# Patient Record
Sex: FEMALE | Race: WHITE | NOT HISPANIC OR LATINO | Employment: PART TIME | URBAN - METROPOLITAN AREA
[De-identification: names, ages, dates, MRNs, and addresses within clinical notes are randomized per-mention and may not be internally consistent; named-entity substitution may affect disease eponyms.]

---

## 2019-03-20 ENCOUNTER — HOSPITAL ENCOUNTER (OUTPATIENT)
Dept: PHYSICAL THERAPY | Facility: CLINIC | Age: 59
Discharge: HOME | End: 2019-03-20
Attending: EMERGENCY MEDICINE

## 2019-08-01 ENCOUNTER — OFFICE VISIT CONVERTED (OUTPATIENT)
Dept: FAMILY MEDICINE CLINIC | Facility: CLINIC | Age: 59
End: 2019-08-01
Attending: NURSE PRACTITIONER

## 2019-08-01 ENCOUNTER — CONVERSION ENCOUNTER (OUTPATIENT)
Dept: FAMILY MEDICINE CLINIC | Facility: CLINIC | Age: 59
End: 2019-08-01

## 2019-10-15 ENCOUNTER — HOSPITAL ENCOUNTER (OUTPATIENT)
Dept: FAMILY MEDICINE CLINIC | Facility: CLINIC | Age: 59
Discharge: HOME | End: 2019-10-15
Attending: NURSE PRACTITIONER

## 2019-10-15 LAB
T4 FREE SERPL-MCNC: 1.3 NG/DL (ref 0.9–1.8)
TSH SERPL-ACNC: 5.42 M[IU]/L (ref 0.27–4.2)

## 2019-11-20 ENCOUNTER — HOSPITAL ENCOUNTER (OUTPATIENT)
Dept: FAMILY MEDICINE CLINIC | Facility: CLINIC | Age: 59
Discharge: HOME | End: 2019-11-20
Attending: NURSE PRACTITIONER

## 2019-11-20 LAB
T4 FREE SERPL-MCNC: 1.2 NG/DL (ref 0.9–1.8)
TSH SERPL-ACNC: 9.81 M[IU]/L (ref 0.27–4.2)

## 2020-01-31 ENCOUNTER — HOSPITAL ENCOUNTER (OUTPATIENT)
Dept: FAMILY MEDICINE CLINIC | Facility: CLINIC | Age: 60
Discharge: HOME | End: 2020-01-31
Attending: NURSE PRACTITIONER

## 2020-01-31 LAB
T4 FREE SERPL-MCNC: 1.6 NG/DL (ref 0.9–1.8)
TSH SERPL-ACNC: 0.34 M[IU]/L (ref 0.27–4.2)

## 2020-06-05 ENCOUNTER — OFFICE VISIT CONVERTED (OUTPATIENT)
Dept: FAMILY MEDICINE CLINIC | Facility: CLINIC | Age: 60
End: 2020-06-05
Attending: NURSE PRACTITIONER

## 2020-06-05 ENCOUNTER — HOSPITAL ENCOUNTER (OUTPATIENT)
Dept: FAMILY MEDICINE CLINIC | Facility: CLINIC | Age: 60
Discharge: HOME | End: 2020-06-05
Attending: NURSE PRACTITIONER

## 2020-06-05 LAB
25(OH)D3 SERPL-MCNC: 30.7 NG/ML (ref 30–100)
ALBUMIN SERPL-MCNC: 4.2 G/DL (ref 3.5–5)
ALBUMIN/GLOB SERPL: 1.4 {RATIO} (ref 1.4–2.6)
ALP SERPL-CCNC: 59 U/L (ref 53–141)
ALT SERPL-CCNC: 19 U/L (ref 10–40)
ANION GAP SERPL CALC-SCNC: 20 MMOL/L (ref 8–19)
AST SERPL-CCNC: 21 U/L (ref 15–50)
BASOPHILS # BLD AUTO: 0.05 10*3/UL (ref 0–0.2)
BASOPHILS NFR BLD AUTO: 1.2 % (ref 0–3)
BILIRUB SERPL-MCNC: 0.21 MG/DL (ref 0.2–1.3)
BUN SERPL-MCNC: 12 MG/DL (ref 5–25)
BUN/CREAT SERPL: 17 {RATIO} (ref 6–20)
CALCIUM SERPL-MCNC: 9.2 MG/DL (ref 8.7–10.4)
CHLORIDE SERPL-SCNC: 103 MMOL/L (ref 99–111)
CHOLEST SERPL-MCNC: 205 MG/DL (ref 107–200)
CHOLEST/HDLC SERPL: 2.7 {RATIO} (ref 3–6)
CONV ABS IMM GRAN: 0.01 10*3/UL (ref 0–0.2)
CONV CO2: 21 MMOL/L (ref 22–32)
CONV IMMATURE GRAN: 0.2 % (ref 0–1.8)
CONV TOTAL PROTEIN: 7.3 G/DL (ref 6.3–8.2)
CREAT UR-MCNC: 0.72 MG/DL (ref 0.5–0.9)
DEPRECATED RDW RBC AUTO: 43.3 FL (ref 36.4–46.3)
EOSINOPHIL # BLD AUTO: 0.19 10*3/UL (ref 0–0.7)
EOSINOPHIL # BLD AUTO: 4.6 % (ref 0–7)
ERYTHROCYTE [DISTWIDTH] IN BLOOD BY AUTOMATED COUNT: 12.6 % (ref 11.7–14.4)
GFR SERPLBLD BASED ON 1.73 SQ M-ARVRAT: >60 ML/MIN/{1.73_M2}
GLOBULIN UR ELPH-MCNC: 3.1 G/DL (ref 2–3.5)
GLUCOSE SERPL-MCNC: 100 MG/DL (ref 65–99)
HCT VFR BLD AUTO: 38.6 % (ref 37–47)
HDLC SERPL-MCNC: 75 MG/DL (ref 40–60)
HGB BLD-MCNC: 12.7 G/DL (ref 12–16)
LDLC SERPL CALC-MCNC: 108 MG/DL (ref 70–100)
LYMPHOCYTES # BLD AUTO: 1.42 10*3/UL (ref 1–5)
LYMPHOCYTES NFR BLD AUTO: 34.1 % (ref 20–45)
MCH RBC QN AUTO: 31 PG (ref 27–31)
MCHC RBC AUTO-ENTMCNC: 32.9 G/DL (ref 33–37)
MCV RBC AUTO: 94.1 FL (ref 81–99)
MONOCYTES # BLD AUTO: 0.29 10*3/UL (ref 0.2–1.2)
MONOCYTES NFR BLD AUTO: 7 % (ref 3–10)
NEUTROPHILS # BLD AUTO: 2.2 10*3/UL (ref 2–8)
NEUTROPHILS NFR BLD AUTO: 52.9 % (ref 30–85)
NRBC CBCN: 0 % (ref 0–0.7)
OSMOLALITY SERPL CALC.SUM OF ELEC: 288 MOSM/KG (ref 273–304)
PLATELET # BLD AUTO: 218 10*3/UL (ref 130–400)
PMV BLD AUTO: 11.7 FL (ref 9.4–12.3)
POTASSIUM SERPL-SCNC: 4.5 MMOL/L (ref 3.5–5.3)
RBC # BLD AUTO: 4.1 10*6/UL (ref 4.2–5.4)
SODIUM SERPL-SCNC: 139 MMOL/L (ref 135–147)
T4 FREE SERPL-MCNC: 1.6 NG/DL (ref 0.9–1.8)
TRIGL SERPL-MCNC: 109 MG/DL (ref 40–150)
TSH SERPL-ACNC: 0.37 M[IU]/L (ref 0.27–4.2)
VLDLC SERPL-MCNC: 22 MG/DL (ref 5–37)
WBC # BLD AUTO: 4.16 10*3/UL (ref 4.8–10.8)

## 2020-06-06 LAB — T3 SERPL-MCNC: 117 NG/DL (ref 71–180)

## 2020-09-21 ENCOUNTER — OFFICE VISIT CONVERTED (OUTPATIENT)
Dept: FAMILY MEDICINE CLINIC | Facility: CLINIC | Age: 60
End: 2020-09-21
Attending: NURSE PRACTITIONER

## 2020-09-21 ENCOUNTER — HOSPITAL ENCOUNTER (OUTPATIENT)
Dept: FAMILY MEDICINE CLINIC | Facility: CLINIC | Age: 60
Discharge: HOME | End: 2020-09-21
Attending: NURSE PRACTITIONER

## 2020-09-21 LAB
CHOLEST SERPL-MCNC: 238 MG/DL (ref 107–200)
CHOLEST/HDLC SERPL: 3.2 {RATIO} (ref 3–6)
FOLATE SERPL-MCNC: 19 NG/ML (ref 4.8–20)
HDLC SERPL-MCNC: 75 MG/DL (ref 40–60)
LDLC SERPL CALC-MCNC: 128 MG/DL (ref 70–100)
T4 FREE SERPL-MCNC: 1.2 NG/DL (ref 0.9–1.8)
TRIGL SERPL-MCNC: 173 MG/DL (ref 40–150)
TSH SERPL-ACNC: 11.4 M[IU]/L (ref 0.27–4.2)
VIT B12 SERPL-MCNC: 502 PG/ML (ref 211–911)
VLDLC SERPL-MCNC: 35 MG/DL (ref 5–37)

## 2020-09-22 LAB — 25(OH)D3 SERPL-MCNC: 36 NG/ML (ref 30–100)

## 2020-12-04 ENCOUNTER — HOSPITAL ENCOUNTER (OUTPATIENT)
Dept: FAMILY MEDICINE CLINIC | Facility: CLINIC | Age: 60
Discharge: HOME | End: 2020-12-04
Attending: NURSE PRACTITIONER

## 2020-12-04 ENCOUNTER — OFFICE VISIT CONVERTED (OUTPATIENT)
Dept: FAMILY MEDICINE CLINIC | Facility: CLINIC | Age: 60
End: 2020-12-04
Attending: NURSE PRACTITIONER

## 2020-12-04 LAB
BASOPHILS # BLD AUTO: 0.06 10*3/UL (ref 0–0.2)
BASOPHILS NFR BLD AUTO: 1.1 % (ref 0–3)
CONV ABS IMM GRAN: 0.01 10*3/UL (ref 0–0.2)
CONV IMMATURE GRAN: 0.2 % (ref 0–1.8)
DEPRECATED RDW RBC AUTO: 43.8 FL (ref 36.4–46.3)
EOSINOPHIL # BLD AUTO: 0.19 10*3/UL (ref 0–0.7)
EOSINOPHIL # BLD AUTO: 3.5 % (ref 0–7)
ERYTHROCYTE [DISTWIDTH] IN BLOOD BY AUTOMATED COUNT: 12.9 % (ref 11.7–14.4)
HCT VFR BLD AUTO: 36.9 % (ref 37–47)
HGB BLD-MCNC: 12.5 G/DL (ref 12–16)
LYMPHOCYTES # BLD AUTO: 1.87 10*3/UL (ref 1–5)
LYMPHOCYTES NFR BLD AUTO: 34.4 % (ref 20–45)
MCH RBC QN AUTO: 31.6 PG (ref 27–31)
MCHC RBC AUTO-ENTMCNC: 33.9 G/DL (ref 33–37)
MCV RBC AUTO: 93.2 FL (ref 81–99)
MONOCYTES # BLD AUTO: 0.32 10*3/UL (ref 0.2–1.2)
MONOCYTES NFR BLD AUTO: 5.9 % (ref 3–10)
NEUTROPHILS # BLD AUTO: 2.98 10*3/UL (ref 2–8)
NEUTROPHILS NFR BLD AUTO: 54.9 % (ref 30–85)
NRBC CBCN: 0 % (ref 0–0.7)
PLATELET # BLD AUTO: 208 10*3/UL (ref 130–400)
PMV BLD AUTO: 11.8 FL (ref 9.4–12.3)
RBC # BLD AUTO: 3.96 10*6/UL (ref 4.2–5.4)
WBC # BLD AUTO: 5.43 10*3/UL (ref 4.8–10.8)

## 2020-12-05 LAB
ALBUMIN SERPL-MCNC: 4.1 G/DL (ref 3.5–5)
ALBUMIN/GLOB SERPL: 1.4 {RATIO} (ref 1.4–2.6)
ALP SERPL-CCNC: 65 U/L (ref 53–141)
ALT SERPL-CCNC: 23 U/L (ref 10–40)
ANION GAP SERPL CALC-SCNC: 12 MMOL/L (ref 8–19)
AST SERPL-CCNC: 25 U/L (ref 15–50)
BILIRUB SERPL-MCNC: <0.15 MG/DL (ref 0.2–1.3)
BUN SERPL-MCNC: 19 MG/DL (ref 5–25)
BUN/CREAT SERPL: 23 {RATIO} (ref 6–20)
CALCIUM SERPL-MCNC: 9 MG/DL (ref 8.7–10.4)
CHLORIDE SERPL-SCNC: 106 MMOL/L (ref 99–111)
CONV CO2: 26 MMOL/L (ref 22–32)
CONV TOTAL PROTEIN: 7 G/DL (ref 6.3–8.2)
CREAT UR-MCNC: 0.82 MG/DL (ref 0.5–0.9)
GFR SERPLBLD BASED ON 1.73 SQ M-ARVRAT: >60 ML/MIN/{1.73_M2}
GLOBULIN UR ELPH-MCNC: 2.9 G/DL (ref 2–3.5)
GLUCOSE SERPL-MCNC: 92 MG/DL (ref 65–99)
OSMOLALITY SERPL CALC.SUM OF ELEC: 290 MOSM/KG (ref 273–304)
POTASSIUM SERPL-SCNC: 4.5 MMOL/L (ref 3.5–5.3)
SODIUM SERPL-SCNC: 139 MMOL/L (ref 135–147)

## 2021-02-25 ENCOUNTER — CONVERSION ENCOUNTER (OUTPATIENT)
Dept: FAMILY MEDICINE CLINIC | Facility: CLINIC | Age: 61
End: 2021-02-25

## 2021-02-25 ENCOUNTER — OFFICE VISIT CONVERTED (OUTPATIENT)
Dept: FAMILY MEDICINE CLINIC | Facility: CLINIC | Age: 61
End: 2021-02-25
Attending: NURSE PRACTITIONER

## 2021-02-25 ENCOUNTER — HOSPITAL ENCOUNTER (OUTPATIENT)
Dept: FAMILY MEDICINE CLINIC | Facility: CLINIC | Age: 61
Discharge: HOME | End: 2021-02-25
Attending: NURSE PRACTITIONER

## 2021-02-25 LAB
BASOPHILS # BLD AUTO: 0.05 10*3/UL (ref 0–0.2)
BASOPHILS NFR BLD AUTO: 0.8 % (ref 0–3)
CONV ABS IMM GRAN: 0.01 10*3/UL (ref 0–0.2)
CONV IMMATURE GRAN: 0.2 % (ref 0–1.8)
DEPRECATED RDW RBC AUTO: 42.3 FL (ref 36.4–46.3)
EOSINOPHIL # BLD AUTO: 0.17 10*3/UL (ref 0–0.7)
EOSINOPHIL # BLD AUTO: 2.7 % (ref 0–7)
ERYTHROCYTE [DISTWIDTH] IN BLOOD BY AUTOMATED COUNT: 12.5 % (ref 11.7–14.4)
HCT VFR BLD AUTO: 36.9 % (ref 37–47)
HGB BLD-MCNC: 12.2 G/DL (ref 12–16)
LYMPHOCYTES # BLD AUTO: 1.65 10*3/UL (ref 1–5)
LYMPHOCYTES NFR BLD AUTO: 26.4 % (ref 20–45)
MCH RBC QN AUTO: 30.7 PG (ref 27–31)
MCHC RBC AUTO-ENTMCNC: 33.1 G/DL (ref 33–37)
MCV RBC AUTO: 92.7 FL (ref 81–99)
MONOCYTES # BLD AUTO: 0.34 10*3/UL (ref 0.2–1.2)
MONOCYTES NFR BLD AUTO: 5.4 % (ref 3–10)
NEUTROPHILS # BLD AUTO: 4.02 10*3/UL (ref 2–8)
NEUTROPHILS NFR BLD AUTO: 64.5 % (ref 30–85)
NRBC CBCN: 0 % (ref 0–0.7)
PLATELET # BLD AUTO: 216 10*3/UL (ref 130–400)
PMV BLD AUTO: 12.1 FL (ref 9.4–12.3)
RBC # BLD AUTO: 3.98 10*6/UL (ref 4.2–5.4)
WBC # BLD AUTO: 6.24 10*3/UL (ref 4.8–10.8)

## 2021-02-26 LAB
25(OH)D3 SERPL-MCNC: 42.6 NG/ML (ref 30–100)
ALBUMIN SERPL-MCNC: 4.2 G/DL (ref 3.5–5)
ALBUMIN/GLOB SERPL: 1.4 {RATIO} (ref 1.4–2.6)
ALP SERPL-CCNC: 76 U/L (ref 53–141)
ALT SERPL-CCNC: 29 U/L (ref 10–40)
ANION GAP SERPL CALC-SCNC: 13 MMOL/L (ref 8–19)
AST SERPL-CCNC: 30 U/L (ref 15–50)
BILIRUB SERPL-MCNC: <0.15 MG/DL (ref 0.2–1.3)
BUN SERPL-MCNC: 23 MG/DL (ref 5–25)
BUN/CREAT SERPL: 26 {RATIO} (ref 6–20)
CALCIUM SERPL-MCNC: 9.2 MG/DL (ref 8.7–10.4)
CHLORIDE SERPL-SCNC: 102 MMOL/L (ref 99–111)
CHOLEST SERPL-MCNC: 236 MG/DL (ref 107–200)
CHOLEST/HDLC SERPL: 2.7 {RATIO} (ref 3–6)
CONV CO2: 26 MMOL/L (ref 22–32)
CONV TOTAL PROTEIN: 7.3 G/DL (ref 6.3–8.2)
CREAT UR-MCNC: 0.89 MG/DL (ref 0.5–0.9)
GFR SERPLBLD BASED ON 1.73 SQ M-ARVRAT: >60 ML/MIN/{1.73_M2}
GLOBULIN UR ELPH-MCNC: 3.1 G/DL (ref 2–3.5)
GLUCOSE SERPL-MCNC: 85 MG/DL (ref 65–99)
HDLC SERPL-MCNC: 87 MG/DL (ref 40–60)
LDLC SERPL CALC-MCNC: 116 MG/DL (ref 70–100)
OSMOLALITY SERPL CALC.SUM OF ELEC: 287 MOSM/KG (ref 273–304)
POTASSIUM SERPL-SCNC: 4.3 MMOL/L (ref 3.5–5.3)
SODIUM SERPL-SCNC: 137 MMOL/L (ref 135–147)
T4 FREE SERPL-MCNC: 1.7 NG/DL (ref 0.9–1.8)
TRIGL SERPL-MCNC: 167 MG/DL (ref 40–150)
TSH SERPL-ACNC: 6.23 M[IU]/L (ref 0.27–4.2)
VLDLC SERPL-MCNC: 33 MG/DL (ref 5–37)

## 2021-02-27 LAB — T3 SERPL-MCNC: 98 NG/DL (ref 71–180)

## 2021-03-12 ENCOUNTER — HOSPITAL ENCOUNTER (OUTPATIENT)
Dept: MRI IMAGING | Facility: HOSPITAL | Age: 61
Discharge: HOME | End: 2021-03-12
Attending: NURSE PRACTITIONER

## 2021-04-01 ENCOUNTER — OFFICE VISIT CONVERTED (OUTPATIENT)
Dept: ORTHOPEDIC SURGERY | Facility: CLINIC | Age: 61
End: 2021-04-01
Attending: ORTHOPAEDIC SURGERY

## 2021-04-02 ENCOUNTER — OFFICE VISIT CONVERTED (OUTPATIENT)
Dept: FAMILY MEDICINE CLINIC | Facility: CLINIC | Age: 61
End: 2021-04-02
Attending: NURSE PRACTITIONER

## 2021-04-02 ENCOUNTER — HOSPITAL ENCOUNTER (OUTPATIENT)
Dept: FAMILY MEDICINE CLINIC | Facility: CLINIC | Age: 61
Discharge: HOME | End: 2021-04-02
Attending: NURSE PRACTITIONER

## 2021-04-02 LAB
T4 FREE SERPL-MCNC: 1.8 NG/DL (ref 0.9–1.8)
TSH SERPL-ACNC: 0.6 M[IU]/L (ref 0.27–4.2)

## 2021-04-03 LAB — T3 SERPL-MCNC: 135 NG/DL (ref 71–180)

## 2021-04-22 ENCOUNTER — OFFICE VISIT CONVERTED (OUTPATIENT)
Dept: ORTHOPEDIC SURGERY | Facility: CLINIC | Age: 61
End: 2021-04-22

## 2021-05-10 NOTE — H&P
History and Physical      Patient Name: Wade Loo   Patient ID: [id number]   Sex: Female   YOB: 1960    Primary Care Provider: Mariel BACON   Referring Provider: Mariel BACON    Visit Date: 2021    Provider: Dexter Mcmullen MD   Location: [facility]   Location Address: [address] [place] [address]  [id number]   Location Phone: [phone]          Chief Complaint  · Right Knee Pain      History Of Present Illness  Wade Loo is a 60 year old /White female who presents today to [facility]. Patient is here for evaluation of her right knee. She has been having pain for quite some time, at lease 4-5 months. She works in a warehouse. Pain is made worse with activity.       Past Medical History  Broken Bones; Depression; Gallstone; Hemorrhoid; Hernia; Night sweats; Sinus trouble; Thyroid Problems         Past Surgical History   section; Gastric Bypass; Hysterectomy; Tummy tuck         Medication List  amitriptyline 100 mg oral tablet; atorvastatin 10 mg oral tablet; calcium carbonate-vitamin D3 500 mg(1,250mg) -200 unit oral tablet; Celebrex 400 mg oral capsule; cyanocobalamin (vitamin B-12) 1,000 mcg/mL injection solution; diclofenac sodium 75 mg oral tablet,delayed release (DR/EC); gabapentin 300 mg oral capsule; levothyroxine 200 mcg oral tablet; methocarbamol 500 mg oral tablet; pantoprazole 40 mg oral tablet,delayed release (DR/EC); Premarin 0.625 mg oral tablet; pyridoxine (vitamin B6) 50 mg oral tablet; zolpidem 10 mg oral tablet         Allergy List  Zoloft         Family Medical History  Stroke; Heart Disease; Heart Attack (MI); Diabetes         Social History  lives with spouse; Tobacco (Former)         Review of Systems  · Constitutional  o Denies  o : fever, chills, weight loss  · Cardiovascular  o Denies  o : chest pain, shortness of breath  · Gastrointestinal  o Denies  o : liver disease, heartburn, nausea, blood  "in stools  · Genitourinary  o Denies  o : painful urination, blood in urine  · Integument  o Denies  o : rash, itching  · Neurologic  o Denies  o : headache, weakness, loss of consciousness  · Musculoskeletal  o Denies  o : painful, swollen joints  · Psychiatric  o Denies  o : drug/alcohol addiction, anxiety, depression      Vitals  Date Time BP Position Site L\R Cuff Size HR RR TEMP (F) WT  HT  BMI kg/m2 BSA m2 O2 Sat FR L/min FiO2 HC       04/01/2021 08:51 AM      80 - R       100 %      04/01/2021 08:51 AM         190lbs 6oz 5'  1\" 35.97 1.93             Physical Examination  · Constitutional  o Appearance  o : well developed, well-nourished, no obvious deformities present  · Head and Face  o Head  o :   § Inspection  § : normocephalic  o Face  o :   § Inspection  § : no facial lesions  · Eyes  o Conjunctivae  o : conjunctivae normal  o Sclerae  o : sclerae white  · Ears, Nose, Mouth and Throat  o Ears  o :   § External Ears  § : appearance within normal limits  § Hearing  § : intact  o Nose  o :   § External Nose  § : appearance normal  · Neck  o Inspection/Palpation  o : normal appearance  o Range of Motion  o : full range of motion  · Respiratory  o Respiratory Effort  o : breathing unlabored  o Inspection of Chest  o : normal appearance  o Auscultation of Lungs  o : no audible wheezing or rales  · Cardiovascular  o Heart  o : regular rate  · Gastrointestinal  o Abdominal Examination  o : soft and non-tender  · Skin and Subcutaneous Tissue  o General Inspection  o : intact, no rashes  · Psychiatric  o General  o : Alert and oriented x3  o Judgement and Insight  o : judgment and insight intact  o Mood and Affect  o : mood normal, affect appropriate  · Right Knee  o Inspection  o : Sensation intact. Pulse is normal. Tender to palpation. Pain with movement.  · In Office Procedures  o View  o : LAT/SUNRISE/STANDING   o Site  o : right, knee  o Indication  o : Right knee pain  o Study  o : X-rays ordered, taken in " the office, and reviewed today.  o Xray  o : X-rays show right knee osteoarthritis.   o Comparative Data  o : No comparative data found          Assessment  · Primary osteoarthritis of right knee     715.16/M17.11  · Right knee pain, unspecified chronicity     719.46/M25.561  · Patellofemoral syndrome of right knee     719.46/M22.2X1      Plan  · Orders  o Knee (Right) [facility] (05300-FY) - 719.46/M25.561 - 04/01/2021  · Medications  o Medications have been Reconciled  o Transition of Care or Provider Policy  · Instructions  o Reviewed the patient's Past Medical, Social, and Family history as well as the ROS at today's visit, no changes.  o Call or return if worsening symptoms.  o This note is transcribed by Anabelle Andino /skyler  o Talked about different treatment options. We gave her a different anti-inflammatory and some exercises. Talked about a Cortisone shot, but she had her Covid vaccine 2 days ago so we are going to wait a couple weeks and then proceed with the Cortisone shot.   · Referrals  o ID: [id number] Date: 03/30/2021 Type: Inbound  Specialty: Orthopedic Surgery            Electronically Signed by: Anabelle Andino, -Author on April 2, 2021 10:25:53 AM  Electronically Co-signed by: Dexter Mcmullen MD -Reviewer on April 5, 2021 10:30:36 PM

## 2021-05-13 NOTE — PROGRESS NOTES
Progress Note      Patient Name: Wade Loo   Patient ID: [id number]   Sex: Female   YOB: 1960    Primary Care Provider: Mariel BACON   Referring Provider: Mariel BACON    Visit Date: June 5, 2020    Provider: ARLEEN Perla   Location: [facility]   Location Address: [address] [place] [address], [address] [place] [address]  [id number]   Location Phone: [phone]          Chief Complaint  · Annual physical exam  · Medication refill/labs rechecked  · Bilateral lower extremity pain      History Of Present Illness  Wade Loo is a 59 year old /White female who presents for evaluation and treatment of:      She is a 59-year-old female who comes in for annual physical exam, labs rechecked along with medication refills.  She is a former smoker, up-to-date on colorectal screening, last mammogram was last year.  Mammogram was normal at that time per patient.  She has a previous history of insomnia, menopausal syndrome, joint/arthritis pain, and thyroid disorder.  She is stable on medications needing refills.  Last labs were done in January thyroid levels at that time was normal.    Patient is complaining of bilateral lower extremity numbness and tingling is been ongoing for the past 2 months.  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  She does wear good supporting shoes.  She states that it is worse while she is up and walking, better when she lays down to rest.  She is currently on amitriptyline.  She is no longer taking that and naproxen, she feels like it is not working very well.    Patient is wanting a letter for work stating that she can only push or pull no more than 10 pounds due to her neck and shoulder arthritis.       Past Medical History  Disease Name Date Onset Notes   Broken Bones 1967 --    Depression --  --    Gallstone 2011 --    Hemorrhoid --  --    Hernia 2014 --    Night sweats --  --    Sinus trouble  --  --    Thyroid Problems --  --          Past Surgical History  Procedure Name Date Notes    section , ,  --    Gastric Bypass  --    Hysterectomy  --    Tummy tuck  --          Medication List  Name Date Started Instructions   amitriptyline 100 mg oral tablet 2020 take 1 tablet (100 mg) by oral route once daily at bedtime for 90 days   calcium carbonate-vitamin D3 500 mg(1,250mg) -200 unit oral tablet  take 1 tablet by oral route   cyanocobalamin (vitamin B-12) 1,000 mcg/mL injection solution 2020 inject 1 milliliter (1,000 mcg) by subcutaneous route once a month for 90 days   ibuprofen 600 mg oral tablet  take 1 tablet (600 mg) by oral route every 6 hours as needed with food   levothyroxine 200 mcg oral tablet 2020 take 1 tablet (200 mcg) by oral route once daily on an empty stomach 30 minutes before breakfast for 90 days   pyridoxine (vitamin B6) 50 mg oral tablet 2020 take 1 tablet by oral route daily for 90 days   zolpidem 10 mg oral tablet 2020 take 1 tablet (10 mg) by oral route once daily at bedtime for 30 days         Allergy List  Allergen Name Date Reaction Notes   Zoloft --  --  --          Family Medical History  Disease Name Relative/Age Notes   Stroke Father/  Mother/   grandparent   Heart Disease Father/   sibling, grandparent   Heart Attack (MI)  sibling, grandparent   Diabetes Mother/   child, grandparent         Social History  Finding Status Start/Stop Quantity Notes   lives with spouse --  --/-- --  --    Tobacco Former --/-- --  --          Review of Systems  · Constitutional  o Denies  o : fever, fatigue, weight loss, weight gain  · Eyes  o Denies  o : double vision, impaired vision, blurred vision  · HENT  o Denies  o : headaches, vertigo, lightheadedness  · Cardiovascular  o Denies  o : lower extremity edema, claudication, chest pressure, palpitations  · Respiratory  o Denies  o : shortness of breath, wheezing, cough,  "hemoptysis, dyspnea on exertion  · Gastrointestinal  o Denies  o : nausea, vomiting, diarrhea, constipation, abdominal pain  · Genitourinary  o Admits  o : Vaginal dryness  o Denies  o : urgency, frequency, dysuria  · Integument  o Denies  o : rash, itching, pigmentation changes  · Neurologic  o Denies  o : altered mental status, incoordination, difficulty concentrating  · Musculoskeletal  o Admits  o : joint pain, muscle pain, muscle cramps, neck pain, shoulder pain  o Denies  o : muscular weakness      Vitals  Date Time BP Position Site L\R Cuff Size HR RR TEMP (F) WT  HT  BMI kg/m2 BSA m2 O2 Sat        06/05/2020 10:22 /78 Sitting    97 - R  97.5 189lbs 0oz 5'  1\" 35.71 1.92 94 %          Physical Examination  · Constitutional  o Appearance  o : well-nourished, well developed, in no acute distress  · Eyes  o Conjunctivae  o : conjunctivae normal, no exudates present  o Sclerae  o : sclerae white  o Pupils and Irises  o : pupils equal and round, and reactive to light and accomodation bilaterally  o Eyelids/Ocular Adnexae  o : extra ocular movements intact  · Respiratory  o Respiratory Effort  o : breathing unlabored, no accessory muscle use  o Inspection of Chest  o : normal appearance, no retractions  o Auscultation of Lungs  o : normal breath sounds bilaterally  · Cardiovascular  o Heart  o :   § Auscultation of Heart  § : regular rate and rhythm, no murmurs, gallops or rubs  o Peripheral Vascular System  o :   § Extremities  § : no edema  · Musculoskeletal  o General  o :   § General Musculoskeletal  § : No joint swelling or deformity., Muscle tone, strength, and development grossly normal.  o Spine  o :   § Inspection/Palpation  § : no spinal tenderness, scoliosis or kyphosis present  · Neurologic  o Mental Status Examination  o :   § Orientation  § : alert and oriented x3  § Speech/Language  § : normal speech pattern  o Gait and Station  o : normal gait, able to stand without " difficulty  · Psychiatric  o Judgement and Insight  o : judgment and insight intact, judgement for everyday activities and social situations within normal limits, insight intact  o Thought Processes  o : rate of thoughts normal, thought content logical  o Mood and Affect  o : mood normal, affect appropriate              Assessment  · Annual physical exam     V70.0/Z00.00  · Hypothyroidism     244.9/E03.9  We will recheck labs, adjust medication if needed. Patient is agreeable treatment plan.  · Polyarthralgia     719.49/M25.50  Will switch anti-inflammatory over to Celebrex, add a muscle relaxer. Discussed return precautions. Patient verbalized understanding.  · Medication refill     V68.1/Z76.0  · Menopausal disorder     627.9/N95.9  Patient prefers the Premarin vaginal cream. Will send over vaginal cream opposed to the pill.    Problems Reconciled  Plan  · Orders  o Physical, Primary Care Panel (CBC, CMP, Lipid, TSH) [facility] (94331, 35458, 82960, 91557) - V70.0/Z00.00 - 06/05/2020  o T3 (Total) (85114) - 244.9/E03.9 - 06/05/2020  o Thyroid Profile (99331, 39636, THYII) - 244.9/E03.9 - 06/05/2020  o Vitamin D (25-Hydroxy) Level (59243) - V70.0/Z00.00, 244.9/E03.9, V68.1/Z76.0, 627.9/N95.9 - 06/05/2020  o ACO-39: Current medications updated and reviewed () - - 06/05/2020  · Medications  o Premarin 0.625 mg/gram vaginal cream   SIG: apply 0.5 gram by topical route once daily cyclically, 3 weeks on and 1 week off for 30 days   DISP: (1) 30 gm tube/kit with 5 refills  Prescribed on 06/05/2020     o Celebrex 400 mg oral capsule   SIG: take 1 capsule (400 mg) by oral route once daily for 90 days   DISP: (90) capsules with 1 refills  Prescribed on 06/05/2020     o methocarbamol 500 mg oral tablet   SIG: take 1 tablet by oral route 3 times a day as needed for 30 days muscle pain/spams   DISP: (120) tablets with 5 refills  Prescribed on 06/05/2020     o amitriptyline 100 mg oral tablet   SIG: take 1 tablet (100 mg) by oral  route once daily at bedtime for 90 days   DISP: (90) tablets with 1 refills  Adjusted on 06/05/2020     o cyanocobalamin (vitamin B-12) 1,000 mcg/mL injection solution   SIG: inject 1 milliliter (1,000 mcg) by subcutaneous route once a month for 90 days   DISP: (3) 1 ml vial with 1 refills  Adjusted on 06/05/2020     o levothyroxine 200 mcg oral tablet   SIG: take 1 tablet (200 mcg) by oral route once daily on an empty stomach 30 minutes before breakfast for 90 days   DISP: (90) tablets with 1 refills  Adjusted on 06/05/2020     o pyridoxine (vitamin B6) 50 mg oral tablet   SIG: take 1 tablet by oral route daily for 90 days   DISP: (90) tablet with 1 refills  Adjusted on 06/05/2020     o zolpidem 10 mg oral tablet   SIG: take 1 tablet (10 mg) by oral route once daily at bedtime for 30 days   DISP: (30) tablet with 5 refills  Adjusted on 06/05/2020     o naproxen 500 mg oral tablet   SIG: take 1 tablet by oral route 2 times a day as needed for 90 days   DISP: (180) tablet with 1 refills  Discontinued on 06/05/2020     o Premarin 0.625 mg oral tablet   SIG: take 1 tablet (0.625 mg) by oral route once daily for 90 days   DISP: (90) tablet with 0 refills  Discontinued on 06/05/2020     o Medications have been Reconciled  o Transition of Care or Provider Policy  · Instructions  o Reviewed health maintenance flowsheet and updated information. Orders were placed and/or patient's response was documented.  o (NSAID) Non-steroidal Anti-inflammatory medication was recommended/prescribed. Ensure you take this medication with food as directed. Do not use Motrin/ibuprofen/Advil while using the anti-inflammatory medication prescribed.  o Take all medications as prescribed/directed.  o Patient was educated/instructed on their diagnosis, treatment and medications prior to discharge from the clinic today.  o Call the office with any concerns or questions.  · Disposition  o Call or Return if symptoms worsen or persist.  o follow up in  6 months  o follow up as needed  o call the office with any questions or concerns            Electronically Signed by: Mariel Craig APRN -Author on June 5, 2020 10:53:02 AM

## 2021-05-13 NOTE — PROGRESS NOTES
Progress Note      Patient Name: Wade Loo   Patient ID: [id number]   Sex: Female   YOB: 1960    Primary Care Provider: Mariel BACON   Referring Provider: Mariel BACON    Visit Date: 2020    Provider: ARLEEN Perla   Location: [facility]   Location Address: [address] [place] [address], [address] [place] [address]  [id number]   Location Phone: [phone]          Chief Complaint  · follow up       History Of Present Illness  Wade Loo is a 59 year old /White female who presents for evaluation and treatment of:      Patient is a 59-year-old female comes in today for follow-up.  Patient has a history of hypothyroidism, hyperlipidemia, and restless legs.  She is stable on medication, and needing medication refills and labs checked.    Patient has been complaining of paresthesia bilateral arms and legs.  Worse first thing in the morning when she awakes, mild during the day.  Waxes and wanes in intensity.  She states it occurs bilateral arms and bilateral legs describes the pain as shooting in nature and electrical feeling.  She was placed on Celebrex at last visit, she does not feel like it is working.  Patient is very active with her job, she is currently working at [employer] and per patient they are about to going to peak season and this is the only time she could get in to be seen.       Past Medical History  Disease Name Date Onset Notes   Broken Bones  --    Depression --  --    Gallstone  --    Hemorrhoid --  --    Hernia  --    Night sweats --  --    Sinus trouble --  --    Thyroid Problems --  --          Past Surgical History  Procedure Name Date Notes    section , ,  --    Gastric Bypass  --    Hysterectomy  --    Tummy tuck  --          Medication List  Name Date Started Instructions   amitriptyline 100 mg oral tablet 2020 take 1 tablet (100 mg) by oral route once daily at  bedtime for 90 days   calcium carbonate-vitamin D3 500 mg(1,250mg) -200 unit oral tablet  take 1 tablet by oral route   Celebrex 400 mg oral capsule 06/05/2020 take 1 capsule (400 mg) by oral route once daily for 90 days   cyanocobalamin (vitamin B-12) 1,000 mcg/mL injection solution 06/05/2020 inject 1 milliliter (1,000 mcg) by subcutaneous route once a month for 90 days   levothyroxine 200 mcg oral tablet 06/05/2020 take 1 tablet (200 mcg) by oral route once daily on an empty stomach 30 minutes before breakfast for 90 days   methocarbamol 500 mg oral tablet 06/05/2020 take 1 tablet by oral route 3 times a day as needed for 30 days muscle pain/spams   Premarin 0.625 mg oral tablet 08/03/2020 take 1 tablet (0.625 mg) by oral route once daily for 21 consecutive days, followed by 7 days off for 30 days   pyridoxine (vitamin B6) 50 mg oral tablet 06/05/2020 take 1 tablet by oral route daily for 90 days   zolpidem 10 mg oral tablet 06/05/2020 take 1 tablet (10 mg) by oral route once daily at bedtime for 30 days         Allergy List  Allergen Name Date Reaction Notes   Zoloft --  --  --          Family Medical History  Disease Name Relative/Age Notes   Stroke Father/  Mother/   grandparent   Heart Disease Father/   sibling, grandparent   Heart Attack (MI)  sibling, grandparent   Diabetes Mother/   child, grandparent         Social History  Finding Status Start/Stop Quantity Notes   lives with spouse --  --/-- --  --    Tobacco Former --/-- --  --          Review of Systems  · Constitutional  o Denies  o : fever, fatigue, weight loss, weight gain  · HENT  o Denies  o : headaches, vertigo, lightheadedness  · Cardiovascular  o Denies  o : lower extremity edema, claudication, chest pressure, palpitations  · Respiratory  o Denies  o : shortness of breath, wheezing, cough, hemoptysis, dyspnea on exertion  · Gastrointestinal  o Denies  o : nausea, vomiting, diarrhea, constipation, abdominal pain  · Neurologic  o Admits  o :  "tingling or numbness  o Denies  o : altered mental status, incoordination, difficulty concentrating  · Musculoskeletal  o Denies  o : joint pain, joint swelling, muscle pain  · Psychiatric  o Denies  o : anxiety, depression, suicidal ideation, homicidal ideation      Vitals  Date Time BP Position Site L\R Cuff Size HR RR TEMP (F) WT  HT  BMI kg/m2 BSA m2 O2 Sat        09/21/2020 08:12 /90 Sitting    83 - R  96 185lbs 16oz 5'  1\" 35.14 1.91 100 %          Physical Examination  · Constitutional  o Appearance  o : well-nourished, well developed, in no acute distress  · Eyes  o Conjunctivae  o : conjunctivae normal, no exudates present  o Sclerae  o : sclerae white  o Pupils and Irises  o : pupils equal and round, and reactive to light and accomodation bilaterally  o Eyelids/Ocular Adnexae  o : extra ocular movements intact  · Respiratory  o Respiratory Effort  o : breathing unlabored, no accessory muscle use  o Inspection of Chest  o : normal appearance, no retractions  o Auscultation of Lungs  o : normal breath sounds bilaterally  · Cardiovascular  o Heart  o :   § Auscultation of Heart  § : regular rate and rhythm, no murmurs, gallops or rubs  o Peripheral Vascular System  o :   § Extremities  § : no edema  · Neurologic  o Mental Status Examination  o :   § Orientation  § : alert and oriented x3  § Speech/Language  § : normal speech pattern  o Gait and Station  o : normal gait, able to stand without difficulty  · Psychiatric  o Judgement and Insight  o : judgment and insight intact, judgement for everyday activities and social situations within normal limits, insight intact  o Thought Processes  o : rate of thoughts normal, thought content logical  o Mood and Affect  o : mood normal, affect appropriate          Results  · In-Office Procedures  o Lab procedure  § IOP - Urine Drug Screen In-House [facility] (58919)   § Amphetamines Ur Ql: Negative   § Barbiturates Ur Ql: Negative   § Buprenorphine+Nor Ur Ql Scn: " Negative   § Benzodiaz Ur Ql: Negative   § Cocaine Ur Ql: Negative   § Methadone Ur Ql: Negative   § Methamphet Ur Ql: Negative   § MDMA Ur Ql Scn: Negative   § Opiates Ur Ql: Negative   § Oxycodone Ur Ql: Negative   § PCP Ur Ql: Negative   § THC Ur Ql: Negative   § Temp in Range?: Within/Acceptable   § Control Seen?: Yes       Assessment  · Hyperlipidemia     272.4/E78.5  Slightly elevated at last visit, will recheck labs if remains stable will discuss once again low-cholesterol diet if it is gone up we will likely place on something for cholesterol. Patient notified and agreeable treatment plan.  · Hypothyroidism     244.9/E03.9  Will recheck labs, adjust medication if needed.  · Need for influenza vaccination     V04.81/Z23  · Paresthesia     782.0/R20.2  Will check vitamin B12 and folate levels, vitamin D levels, will start on gabapentin at bedtime. Discussed return precautions. Patient verbalized understanding.    Problems Reconciled  Plan  · Orders  o Lipid Panel [facility] (59901) - 244.9/E03.9, 272.4/E78.5 - 09/21/2020  o Thyroid Profile (93610, 62152, THYII) - 244.9/E03.9, 272.4/E78.5 - 09/21/2020  o Vitamin D (25-Hydroxy) Level (92174) - 244.9/E03.9, 272.4/E78.5, 782.0/R20.2 - 09/21/2020  o ACO-39: Current medications updated and reviewed () - - 09/21/2020  o Vitamin B-12 (03694) - 244.9/E03.9, 272.4/E78.5, 782.0/R20.2 - 09/21/2020  o Folate (Folic Acid) (94176) - 244.9/E03.9, 272.4/E78.5, 782.0/R20.2 - 09/21/2020  · Medications  o gabapentin 300 mg oral capsule   SIG: take 1 capsule by oral route once a day (at bedtime) for 90 days   DISP: (90) capsules with 1 refills  Prescribed on 09/21/2020     o Voltaren 1 % topical gel   SIG: apply 2 grams to the affected area(s) by topical route 4 times per day for 30 days   DISP: (1) 100 gm tube with 5 refills  Prescribed on 09/21/2020     o ibuprofen 600 mg oral tablet   SIG: take 1 tablet (600 mg) by oral route every 6 hours as needed with food   DISP: (0) tablet  with 0 refills  Discontinued on 09/21/2020     o Medications have been Reconciled  o Transition of Care or Provider Policy  · Instructions  o Recommended exercise program to assist with cholesterol, weight loss and overall health improvement.  o Advised that cheeses and other sources of dairy fats, animal fats, fast food, and the extras (candy, pastries, pies, doughnuts and cookies) all contain LDL raising nutrients. Advised to increase fruits, vegetables, whole grains, and to monitor portion sizes.   o Flu vaccine declined.  o Take all medications as prescribed/directed.  o Patient was educated/instructed on their diagnosis, treatment and medications prior to discharge from the clinic today.  o Patient given paper scripts today.  o Flu vaccine declined.  · Disposition  o Call or Return if symptoms worsen or persist.  o follow up in 6 months  o follow up as needed  o call the office with any questions or concerns            Electronically Signed by: Mariel Craig APRN -Author on September 21, 2020 09:39:19 AM

## 2021-05-13 NOTE — PROGRESS NOTES
Progress Note      Patient Name: Wade Loo   Patient ID: [id number]   Sex: Female   YOB: 1960    Primary Care Provider: Mariel BACON   Referring Provider: Mariel BACON    Visit Date: 2020    Provider: ARLEEN Kelly   Location: [facility]   Location Address: [address] [place] [address], [address] [place] [address]  [id number]   Location Phone: [phone]          Chief Complaint  · passed out at wk      History Of Present Illness  Wade Loo is a 60 year old /White female who presents for evaluation and treatment of:      She is here for an acute visit today, she is a patient of ARLEEN Torres.    She states she passed out at work yesterday.  She states she does not remember passing out but her coworkers told her she passed out.  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  She does state it is warm in the building.  She states she drinks coffee and she drinks about 4 bottles of water per day.  She states she gets about 5 to 6 hours of sleep per night, denies missing any meals, denies any drugs or alcohol use.  She denies any chest pain or shortness of breath.    EKG done in office today normal sinus rhythm at 64 bpm, no ST abnormalities.       Past Medical History  Disease Name Date Onset Notes   Broken Bones  --    Depression --  --    Gallstone  --    Hemorrhoid --  --    Hernia  --    Night sweats --  --    Sinus trouble --  --    Thyroid Problems --  --          Past Surgical History  Procedure Name Date Notes    section , ,  --    Gastric Bypass  --    Hysterectomy  --    Tummy tuck  --          Medication List  Name Date Started Instructions   amitriptyline 100 mg oral tablet 2020 take 1 tablet (100 mg) by oral route once daily at bedtime for 90 days   atorvastatin 10 mg oral tablet 2020 take 1 tablet (10 mg) by oral route once daily at bedtime  for 90 days   calcium carbonate-vitamin D3 500 mg(1,250mg) -200 unit oral tablet  take 1 tablet by oral route   Celebrex 400 mg oral capsule 06/05/2020 take 1 capsule (400 mg) by oral route once daily for 90 days   cyanocobalamin (vitamin B-12) 1,000 mcg/mL injection solution 06/05/2020 inject 1 milliliter (1,000 mcg) by subcutaneous route once a month for 90 days   gabapentin 300 mg oral capsule 09/21/2020 take 1 capsule by oral route once a day (at bedtime) for 90 days   levothyroxine 200 mcg oral tablet 06/05/2020 take 1 tablet (200 mcg) by oral route once daily on an empty stomach 30 minutes before breakfast for 90 days   methocarbamol 500 mg oral tablet 06/05/2020 take 1 tablet by oral route 3 times a day as needed for 30 days muscle pain/spams   Premarin 0.625 mg oral tablet 08/03/2020 take 1 tablet (0.625 mg) by oral route once daily for 21 consecutive days, followed by 7 days off for 30 days   pyridoxine (vitamin B6) 50 mg oral tablet 06/05/2020 take 1 tablet by oral route daily for 90 days   Voltaren 1 % topical gel 09/21/2020 apply 2 grams to the affected area(s) by topical route 4 times per day for 30 days   zolpidem 10 mg oral tablet 06/05/2020 take 1 tablet (10 mg) by oral route once daily at bedtime for 30 days         Allergy List  Allergen Name Date Reaction Notes   Zoloft --  --  --        Allergies Reconciled  Family Medical History  Disease Name Relative/Age Notes   Stroke Father/  Mother/   grandparent   Heart Disease Father/   sibling, grandparent   Heart Attack (MI)  sibling, grandparent   Diabetes Mother/   child, grandparent         Social History  Finding Status Start/Stop Quantity Notes   lives with spouse --  --/-- --  --    Tobacco Former --/-- --  --          Review of Systems  · Constitutional  o Denies  o : fatigue, fever, chills, body aches, night sweats  · HENT  o Denies  o : headaches, vertigo, lightheadedness, recent head injury, nasal discharge, tinnitus, ear  "pain  · Cardiovascular  o Denies  o : lower extremity edema, claudication, chest pressure, palpitations  · Respiratory  o Denies  o : shortness of breath, wheezing, cough, hemoptysis, dyspnea on exertion  · Gastrointestinal  o Denies  o : nausea, vomiting, diarrhea, constipation, abdominal pain  · Genitourinary  o Denies  o : urgency, frequency, dysuria  · Integument  o Denies  o : rash, itching      Vitals  Date Time BP Position Site L\R Cuff Size HR RR TEMP (F) WT  HT  BMI kg/m2 BSA m2 O2 Sat FR L/min FiO2 HC       12/04/2020 03:49 /82 Sitting    71 - R  97.1 188lbs 8oz 5'  1\" 35.62 1.92 100 %            Physical Examination  · Constitutional  o Appearance  o : no acute distress, well-nourished  · Head and Face  o Head  o :   § Inspection  § : atraumatic, normocephalic  · Ears, Nose, Mouth and Throat  o Ears  o :   § External Ears  § : normal  § Otoscopic Examination  § : tympanic membrane appearance within normal limits bilaterally  o Oral Cavity  o :   § Oral Mucosa  § : moist mucous membranes  o Throat  o :   § Oropharynx  § : no inflammation or lesions present  · Neck  o Thyroid  o : gland size normal, nontender, no nodules or masses present on palpation, symmetric  · Respiratory  o Respiratory Effort  o : breathing comfortably, symmetric chest rise  o Auscultation of Lungs  o : clear to asculatation bilaterally, no wheezes, rales, or rhonchii  · Cardiovascular  o Heart  o :   § Auscultation of Heart  § : regular rate and rhythm, no murmurs, rubs, or gallops  o Peripheral Vascular System  o :   § Extremities  § : no edema  · Lymphatic  o Neck  o : no lymphadenopathy present  · Neurologic  o Mental Status Examination  o :   § Orientation  § : grossly oriented to person, place and time  o Gait and Station  o :   § Gait Screening  § : normal gait  · Psychiatric  o General  o : normal mood and affect          Assessment  · Syncope and collapse     780.2/R55  EKG unremarkable, will get labs today, will call " with results. Advised patient that if she has any more episodes of syncope that she needs to go to the emergency department and follow-up with her PCP. Encouraged her to drink more water.      Plan  · Orders  o CBC with Auto Diff [facility] ([id number]) - 780.2/R55 - 12/04/2020  o CMP [facility] ([id number]) - 780.2/R55 - 12/04/2020  o ACO-39: Current medications updated and reviewed (, 1159F) - - 12/04/2020  o EKG (Recording only) [facility] ([id number]) - 780.2/R55 - 12/04/2020   EKG done in office today  · Instructions  o Rest. Increase Fluids.  o Patient was educated/instructed on their diagnosis, treatment and medications prior to discharge from the clinic today.  o Patient instructed to seek medical attention urgently for new or worsening symptoms.  o Call the office with any concerns or questions.  · Disposition  o Call or Return if symptoms worsen or persist.            Electronically Signed by: ARLEEN Kelly -Author on December 4, 2020 04:16:54 PM

## 2021-05-14 ENCOUNTER — CONVERSION ENCOUNTER (OUTPATIENT)
Dept: FAMILY MEDICINE CLINIC | Facility: CLINIC | Age: 61
End: 2021-05-14

## 2021-05-14 ENCOUNTER — OFFICE VISIT CONVERTED (OUTPATIENT)
Dept: FAMILY MEDICINE CLINIC | Facility: CLINIC | Age: 61
End: 2021-05-14
Attending: NURSE PRACTITIONER

## 2021-05-14 VITALS — BODY MASS INDEX: 35.51 KG/M2 | HEART RATE: 85 BPM | OXYGEN SATURATION: 98 % | HEIGHT: 62 IN | WEIGHT: 193 LBS

## 2021-05-14 VITALS
SYSTOLIC BLOOD PRESSURE: 122 MMHG | HEIGHT: 61 IN | BODY MASS INDEX: 35.4 KG/M2 | DIASTOLIC BLOOD PRESSURE: 84 MMHG | TEMPERATURE: 98.2 F | HEART RATE: 92 BPM | WEIGHT: 187.5 LBS | OXYGEN SATURATION: 100 %

## 2021-05-14 VITALS
OXYGEN SATURATION: 100 % | SYSTOLIC BLOOD PRESSURE: 128 MMHG | HEIGHT: 61 IN | DIASTOLIC BLOOD PRESSURE: 90 MMHG | WEIGHT: 186 LBS | TEMPERATURE: 96 F | HEART RATE: 83 BPM | BODY MASS INDEX: 35.12 KG/M2

## 2021-05-14 VITALS
WEIGHT: 188.5 LBS | TEMPERATURE: 97.1 F | BODY MASS INDEX: 35.59 KG/M2 | OXYGEN SATURATION: 100 % | SYSTOLIC BLOOD PRESSURE: 140 MMHG | DIASTOLIC BLOOD PRESSURE: 82 MMHG | HEIGHT: 61 IN | HEART RATE: 71 BPM

## 2021-05-14 VITALS
SYSTOLIC BLOOD PRESSURE: 112 MMHG | DIASTOLIC BLOOD PRESSURE: 80 MMHG | OXYGEN SATURATION: 98 % | BODY MASS INDEX: 35.73 KG/M2 | HEART RATE: 84 BPM | HEIGHT: 61 IN | WEIGHT: 189.25 LBS

## 2021-05-14 VITALS — WEIGHT: 190.37 LBS | BODY MASS INDEX: 35.94 KG/M2 | HEIGHT: 61 IN | OXYGEN SATURATION: 100 % | HEART RATE: 80 BPM

## 2021-05-14 NOTE — PROGRESS NOTES
Progress Note      Patient Name: Wade Loo   Patient ID: [id number]   Sex: Female   YOB: 1960    Primary Care Provider: Mariel BACON   Referring Provider: Mariel BACON    Visit Date: 2021    Provider: Leo Ace PA-C   Location: [facility]   Location Address: [address] [place] [address]  [id number]   Location Phone: [phone]          Chief Complaint  · right knee pain      History Of Present Illness  Wade Loo is a 60 year old /White female who presents today to [facility].      Patient follows up today for right knee pain and osteoarthritis with symptoms that have persisted for approximately 5 months or more.       Past Medical History  Broken Bones; Depression; Gallstone; Hemorrhoid; Hernia; Night sweats; Sinus trouble; Thyroid Problems         Past Surgical History   section; Gastric Bypass; Hysterectomy; Tummy tuck         Medication List  amitriptyline 100 mg oral tablet; atorvastatin 10 mg oral tablet; calcium carbonate-vitamin D3 500 mg(1,250mg) -200 unit oral tablet; Celebrex 400 mg oral capsule; cyanocobalamin (vitamin B-12) 1,000 mcg/mL injection solution; diclofenac sodium 75 mg oral tablet,delayed release (DR/EC); gabapentin 300 mg oral capsule; levothyroxine 200 mcg oral tablet; methocarbamol 500 mg oral tablet; pantoprazole 40 mg oral tablet,delayed release (DR/EC); Premarin 0.625 mg oral tablet; pyridoxine (vitamin B6) 50 mg oral tablet; zolpidem 10 mg oral tablet         Allergy List  Zoloft         Family Medical History  Stroke; Heart Disease; Heart Attack (MI); Diabetes         Social History  lives with spouse; Tobacco (Former)         Immunizations  Name Date Admin   Influenza 10/01/2020         Review of Systems  · Constitutional  o Denies  o : fever, chills, weight loss  · Cardiovascular  o Denies  o : chest pain, shortness of breath  · Gastrointestinal  o Denies  o : liver  "disease, heartburn, nausea, blood in stools  · Genitourinary  o Denies  o : painful urination, blood in urine  · Integument  o Denies  o : rash, itching  · Neurologic  o Denies  o : headache, weakness, loss of consciousness  · Musculoskeletal  o Denies  o : painful, swollen joints  · Psychiatric  o Denies  o : drug/alcohol addiction, anxiety, depression      Vitals  Date Time BP Position Site L\R Cuff Size HR RR TEMP (F) WT  HT  BMI kg/m2 BSA m2 O2 Sat FR L/min FiO2        04/22/2021 03:37 PM      85 - R   193lbs 0oz 5'  2\" 35.3 1.96 98 %            Physical Examination  · Constitutional  o Appearance  o : well developed, well-nourished, no obvious deformities present  · Head and Face  o Head  o :   § Inspection  § : normocephalic  o Face  o :   § Inspection  § : no facial lesions  · Eyes  o Conjunctivae  o : conjunctivae normal  o Sclerae  o : sclerae white  · Ears, Nose, Mouth and Throat  o Ears  o :   § External Ears  § : appearance within normal limits  § Hearing  § : intact  o Nose  o :   § External Nose  § : appearance normal  · Neck  o Inspection/Palpation  o : normal appearance  o Range of Motion  o : full range of motion  · Respiratory  o Respiratory Effort  o : breathing unlabored  o Inspection of Chest  o : normal appearance  o Auscultation of Lungs  o : no audible wheezing or rales  · Cardiovascular  o Heart  o : regular rate  · Gastrointestinal  o Abdominal Examination  o : soft and non-tender  · Skin and Subcutaneous Tissue  o General Inspection  o : intact, no rashes  · Psychiatric  o General  o : Alert and oriented x3  o Judgement and Insight  o : judgment and insight intact  o Mood and Affect  o : mood normal, affect appropriate  · Right Knee  o Inspection  o : Appearance is normal anatomic and atraumatic. Range of motion is 0 degrees of extension to approximately 140 degrees of flexion with pain throughout the range of motion. Good resistance in flexion and extension. Neurovascularly intact " distally.  · Injection Note/Aspiration Note  o Site  o : right knee  o Procedure  o : Procedure: After educating the patient, patient gave consent for procedure. After using Chloraprep, the joint space was injected. The patient tolerated the procedure well.  o Medication  o : 80 mg of DepoMedrol with 9cc of 1% Lidocaine          Assessment  · Primary osteoarthritis of right knee     715.16/M17.11  · Pain: Knee     719.46/M25.569      Plan  · Orders  o Depo-Medrol injection 80mg () - 719.46/M25.569 - 04/22/2021   Lot [id number] Exp 02 2022 [employer] Administered by Choco NORWOOD  o Knee Intra-articular Injection without US Guidance [facility] ([id number]) - 719.46/M25.569 - 04/22/2021   Lot [id number] Exp 02 01 2022 [employer] Administered by Choco NORWOOD  · Instructions  o Reviewed the patient's Past Medical, Social, and Family history as well as the ROS at today's visit, no changes.  o Call or return if worsening symptoms.  o Patient to follow-up in 6 weeks to evaluate her progress and symptoms.  o Portions of this note were generated with voice recognition software. While efforts have been made to proofread the text, some sound alike errors may still persist.   · Referrals  o ID: [id number] Date: 03/30/2021 Type: Inbound  Specialty: Orthopedic Surgery            Electronically Signed by: ENMA Pro-C -Author on April 22, 2021 05:06:09 PM  Electronically Co-signed by: Dexter Mcmullen MD -Reviewer on April 22, 2021 10:05:26 PM

## 2021-05-14 NOTE — PROGRESS NOTES
Progress Note      Patient Name: Wade Loo   Patient ID: [id number]   Sex: Female   YOB: 1960    Primary Care Provider: Mariel BACON   Referring Provider: Mariel BACON    Visit Date: 2021    Provider: ARLEEN Perla   Location: [facility]   Location Address: [address] [place] [address], [address] [place] [address]  [id number]   Location Phone: [phone]          Chief Complaint  · 6 week follow-up      History Of Present Illness  Wade Loo is a 60 year old /White female who presents for evaluation and treatment of:      60-year-old female who comes in to discuss weight loss.  She states in  she had a gastric bypass surgery and has been able to keep her weight down but she feels like over the past year her weight has slowly creep back up.  She is currently on 189 pounds her BMI is 35.8.  She works 10-hour days at a [employer], she admits that she eats conveniently, she states she typically describes sandwiches or burgers.  At last visit 6 weeks ago her cholesterol triglycerides and LDL were all elevated slightly, we did discuss at that time a diet change to get those down naturally.  She does have a history of hypothyroidism she is currently on 200 mcg of levothyroxine.  At her visit 6 weeks ago her thyroid was slightly elevated at 6.2 but T3 and T4 within normal limits so we were going to repeat her levels today, if continues to be elevated we will adjust medication.       Past Medical History  Disease Name Date Onset Notes   Broken Bones  --    Depression --  --    Gallstone  --    Hemorrhoid --  --    Hernia  --    Night sweats --  --    Sinus trouble --  --    Thyroid Problems --  --          Past Surgical History  Procedure Name Date Notes    section , ,  --    Gastric Bypass  --    Hysterectomy  --    Tummy tuck  --          Medication List  Name Date Started Instructions    amitriptyline 100 mg oral tablet 02/25/2021 take 1 tablet (100 mg) by oral route once daily at bedtime for 90 days   atorvastatin 10 mg oral tablet 02/25/2021 take 1 tablet (10 mg) by oral route once daily at bedtime for 90 days   calcium carbonate-vitamin D3 500 mg(1,250mg) -200 unit oral tablet  take 1 tablet by oral route   Celebrex 400 mg oral capsule 02/25/2021 take 1 capsule (400 mg) by oral route once daily for 90 days   cyanocobalamin (vitamin B-12) 1,000 mcg/mL injection solution 02/25/2021 inject 1 milliliter (1,000 mcg) by subcutaneous route once a month for 90 days   diclofenac sodium 75 mg oral tablet,delayed release (DR/EC) 04/01/2021 take 1 tablet (75 mg) by oral route 2 times per day   gabapentin 300 mg oral capsule 02/25/2021 take 1 capsule by oral route once a day (at bedtime) for 90 days   levothyroxine 200 mcg oral tablet 02/25/2021 take 1 tablet (200 mcg) by oral route once daily on an empty stomach 30 minutes before breakfast for 90 days   methocarbamol 500 mg oral tablet 02/25/2021 take 1 tablet by oral route 3 times a day as needed for 30 days muscle pain/spams   pantoprazole 40 mg oral tablet,delayed release (DR/EC)  take 1 tablet (40 mg) by oral route once daily   Premarin 0.625 mg oral tablet 03/05/2021 take 1 tablet (0.625 mg) by oral route once daily for 21 consecutive days, followed by 7 days off for 90 days   pyridoxine (vitamin B6) 50 mg oral tablet 06/05/2020 take 1 tablet by oral route daily for 90 days   zolpidem 10 mg oral tablet 02/25/2021 take 1 tablet (10 mg) by oral route once daily at bedtime for 90 days         Allergy List  Allergen Name Date Reaction Notes   Zoloft --  --  --          Family Medical History  Disease Name Relative/Age Notes   Stroke Father/  Mother/   grandparent   Heart Disease Father/   sibling, grandparent   Heart Attack (MI)  sibling, grandparent   Diabetes Mother/   child, grandparent         Social History  Finding Status Start/Stop Quantity Notes  "  lives with spouse --  --/-- --  --    Tobacco Former --/-- --  --          Immunizations  NameDate Admin Mfg Trade Name Lot Number Route Inj VIS Given VIS Publication   [id number]/01/2020 [facility] [employer]  NE NE 02/25/2021    Comments:          Review of Systems  · Constitutional  o Admits  o : weight gain  o Denies  o : fever, fatigue, weight loss  · Cardiovascular  o Denies  o : lower extremity edema, claudication, chest pressure, palpitations  · Respiratory  o Denies  o : shortness of breath, wheezing, cough, hemoptysis, dyspnea on exertion  · Gastrointestinal  o Denies  o : nausea, vomiting, diarrhea, constipation, abdominal pain      Vitals  Date Time BP Position Site L\R Cuff Size HR RR TEMP (F) WT  HT  BMI kg/m2 BSA m2 O2 Sat FR L/min FiO2 HC       04/02/2021 08:19 /80 Sitting    84 - R   189lbs 4oz 5'  1\" 35.76 1.92 98 %            Physical Examination  · Constitutional  o Appearance  o : well-nourished, well developed, in no acute distress  · Eyes  o Conjunctivae  o : conjunctivae normal, no exudates present  o Sclerae  o : sclerae white  o Pupils and Irises  o : pupils equal and round, and reactive to light and accomodation bilaterally  o Eyelids/Ocular Adnexae  o : extra ocular movements intact  · Respiratory  o Respiratory Effort  o : breathing unlabored, no accessory muscle use  o Inspection of Chest  o : normal appearance, no retractions  o Auscultation of Lungs  o : normal breath sounds bilaterally  · Cardiovascular  o Heart  o :   § Auscultation of Heart  § : regular rate and rhythm, no murmurs, gallops or rubs  o Peripheral Vascular System  o :   § Extremities  § : no edema  · Neurologic  o Mental Status Examination  o :   § Orientation  § : alert and oriented x3  § Speech/Language  § : normal speech pattern  o Gait and Station  o : normal gait, able to stand without difficulty              Assessment  · Hypothyroidism     244.9/E03.9  Will recheck labs today if remain elevated will " adjust medication.  · Obesity     278.00/E66.9  · Encounter for weight loss counseling     V65.3/Z71.3  Patient given a handout packet for weight loss management and tips. Discussed with patient that diets do not work this must be a lifestyle change that she does on a daily basis. Will patient follow-up in 3 months in office and see how she is doing at that time we will recheck her cholesterol.      Plan  · Orders  o T3 (Total) (76513) - 244.9/E03.9 - 04/02/2021  o ACO-39: Current medications updated and reviewed (1159F, ) - - 04/02/2021  · Medications  o Medications have been Reconciled  o Transition of Care or Provider Policy  · Instructions  o Take all medications as prescribed/directed.  o Patient was educated/instructed on their diagnosis, treatment and medications prior to discharge from the clinic today.  o Call the office with any concerns or questions.  · Disposition  o Call or Return if symptoms worsen or persist.  o follow up as needed  o call the office with any questions or concerns  o Follow-up in 3 months            Electronically Signed by: Mariel Craig APRN -Author on April 2, 2021 08:47:36 AM

## 2021-05-14 NOTE — PROGRESS NOTES
Progress Note      Patient Name: Wade Loo   Patient ID: [id number]   Sex: Female   YOB: 1960    Primary Care Provider: Mariel BACON   Referring Provider: Mariel BACON    Visit Date: 2021    Provider: ARLEEN Perla   [place] [facility] [place] [address] [place] [address], [address] [place] [address]  [id number]   Location Phone: [phone]          Chief Complaint  · F/U and Labs  · Right knee pain      History Of Present Illness  Wade Loo is a 60 year old /White female who presents for evaluation and treatment of:      60-year-old female who comes in for annual physical exam.  She has ongoing history of hyperlipidemia, joint pain, thyroid disorder, and insomnia.  Her last visit in office was 2020, labs were done at that time cholesterol was elevated medication was adjusted, vitamin B12 was normal, thyroid levels were elevated and medication was adjusted.  Patient's blood pressure stable today at 142/84.  She denies any headache, chest pain, or change in vision.    Patient is complaining of right knee pain, she feels like her knee is giving out and buckling, it hurts when she stands on her knee for too long.Patient states that her right knee has been giving out on her.  She denies any specific injury.  She states that it has been buckling, pain with long standing.  She states that the pain initially occurred when she was at work, and has not improved.       Past Medical History  Disease Name Date Onset Notes   Broken Bones  --    Depression --  --    Gallstone  --    Hemorrhoid --  --    Hernia  --    Night sweats --  --    Sinus trouble --  --    Thyroid Problems --  --          Past Surgical History  Procedure Name Date Notes    section , ,  --    Gastric Bypass  --    Hysterectomy  --    Tummy tuck  --          Medication List  Name Date Started Instructions    amitriptyline 100 mg oral tablet 02/25/2021 take 1 tablet (100 mg) by oral route once daily at bedtime for 90 days   atorvastatin 10 mg oral tablet 02/25/2021 take 1 tablet (10 mg) by oral route once daily at bedtime for 90 days   calcium carbonate-vitamin D3 500 mg(1,250mg) -200 unit oral tablet  take 1 tablet by oral route   Celebrex 400 mg oral capsule 02/25/2021 take 1 capsule (400 mg) by oral route once daily for 90 days   cyanocobalamin (vitamin B-12) 1,000 mcg/mL injection solution 02/25/2021 inject 1 milliliter (1,000 mcg) by subcutaneous route once a month for 90 days   gabapentin 300 mg oral capsule 02/25/2021 take 1 capsule by oral route once a day (at bedtime) for 90 days   levothyroxine 200 mcg oral tablet 02/25/2021 take 1 tablet (200 mcg) by oral route once daily on an empty stomach 30 minutes before breakfast for 90 days   methocarbamol 500 mg oral tablet 02/25/2021 take 1 tablet by oral route 3 times a day as needed for 30 days muscle pain/spams   pantoprazole 40 mg oral tablet,delayed release (DR/EC)  take 1 tablet (40 mg) by oral route once daily   pyridoxine (vitamin B6) 50 mg oral tablet 06/05/2020 take 1 tablet by oral route daily for 90 days   zolpidem 10 mg oral tablet 02/25/2021 take 1 tablet (10 mg) by oral route once daily at bedtime for 90 days         Allergy List  Allergen Name Date Reaction Notes   Zoloft --  --  --        Allergies Reconciled  Family Medical History  Disease Name Relative/Age Notes   Stroke Father/  Mother/   grandparent   Heart Disease Father/   sibling, grandparent   Heart Attack (MI)  sibling, grandparent   Diabetes Mother/   child, grandparent         Social History  Finding Status Start/Stop Quantity Notes   lives with spouse --  --/-- --  --    Tobacco Former --/-- --  --          Review of Systems  · Constitutional  o Denies  o : fever, fatigue, weight loss, weight gain  · Eyes  o Denies  o : impaired vision, blurred vision, changes in  "vision  · HENT  o Denies  o : headaches, vertigo, lightheadedness  · Cardiovascular  o Denies  o : lower extremity edema, claudication, chest pressure, palpitations  · Respiratory  o Denies  o : shortness of breath, wheezing, cough, hemoptysis, dyspnea on exertion  · Gastrointestinal  o Denies  o : nausea, vomiting, diarrhea, constipation, abdominal pain  · Integument  o Denies  o : rash, itching, pigmentation changes  · Musculoskeletal  o Admits  o : joint pain, knee pain  o Denies  o : joint swelling  · Psychiatric  o Denies  o : anxiety, depression, suicidal ideation, homicidal ideation      Vitals  Date Time BP Position Site L\R Cuff Size HR RR TEMP (F) WT  HT  BMI kg/m2 BSA m2 O2 Sat FR L/min FiO2 HC       02/25/2021 02:03 /84 Sitting    92 - R  98.2 187lbs 8oz 5'  1\" 35.43 1.91 100 %            Physical Examination  · Constitutional  o Appearance  o : well-nourished, well developed, in no acute distress  · Eyes  o Conjunctivae  o : conjunctivae normal, no exudates present  o Sclerae  o : sclerae white  o Pupils and Irises  o : pupils equal and round, and reactive to light and accomodation bilaterally  o Eyelids/Ocular Adnexae  o : extra ocular movements intact  · Respiratory  o Respiratory Effort  o : breathing unlabored, no accessory muscle use  o Inspection of Chest  o : normal appearance, no retractions  o Auscultation of Lungs  o : normal breath sounds bilaterally  · Cardiovascular  o Heart  o :   § Auscultation of Heart  § : regular rate and rhythm, no murmurs, gallops or rubs  o Peripheral Vascular System  o :   § Extremities  § : no edema  · Neurologic  o Mental Status Examination  o :   § Orientation  § : alert and oriented x3  § Speech/Language  § : normal speech pattern  o Gait and Station  o : normal gait, able to stand without difficulty  · Psychiatric  o Judgement and Insight  o : judgment and insight intact, judgement for everyday activities and social situations within normal limits, " insight intact  o Thought Processes  o : rate of thoughts normal, thought content logical  o Mood and Affect  o : mood normal, affect appropriate  · Right Knee  o Inspection  o : no redness, swelling, deformity, bruising, or effusion  o Palpation  o : lateral joint line tenderness present, patellar tendon tenderness present   o Range of Motion  o : normal range of motion,Crepitus felt with range of motion              Assessment  · Annual physical exam     V70.0/Z00.00  · Hyperlipidemia     272.4/E78.5  · Hypothyroidism     244.9/E03.9  · Vitamin D deficiency     268.9/E55.9  · Visit for screening mammogram     V76.12/Z12.31  · Right knee pain     719.46/M25.561  Will send patient over for MRI of right knee, discussed with patient to get a brace to wear with a hinge supporting those lateral and medial ligaments until she gets her MRI. Discussed return precautions. Patient verbalized understanding is agreeable treatment plan.  · Neuropathy     355.9/G62.9  Patient is doing well on the medication she is currently on gabapentin 300 mg at bedtime. She is needing refills. UDS and consent is up-to-date. Patient is at low risk for dependency. Jerry is appropriate.      Plan  · Orders  o HTN/Lipid Panel (CMP, Lipid) [facility] (13237, 60035) - 272.4/E78.5 - 02/25/2021  o T3 (Total) (29835) - 244.9/E03.9 - 02/25/2021  o Thyroid Profile (36429, 61220, THYII) - 244.9/E03.9 - 02/25/2021  o Vitamin D Level (31871) - 268.9/E55.9 - 02/25/2021  o Screening Mammography; Bilateral 3D (75557, , 84577) - V76.12/Z12.31 - 02/25/2021  o CBC with Auto Diff [facility] (59441) - V70.0/Z00.00, 244.9/E03.9, 272.4/E78.5 - 02/25/2021  o ACO-18: Negative screen for clinical depression using a standardized tool () - - 02/25/2021   4  o ACO-14: Influenza immunization administered or previously received [facility] () - - 02/25/2021  o ACO-39: Current medications updated and reviewed (, 1159F) - - 02/25/2021  o MRI knee right wo contrast (30577) -  719.46/M25.561 - 02/25/2021  · Medications  o amitriptyline 100 mg oral tablet   SIG: take 1 tablet (100 mg) by oral route once daily at bedtime for 90 days   DISP: (90) Tablet with 1 refills  Adjusted on 02/25/2021     o atorvastatin 10 mg oral tablet   SIG: take 1 tablet (10 mg) by oral route once daily at bedtime for 90 days   DISP: (90) Tablet with 1 refills  Adjusted on 02/25/2021     o Celebrex 400 mg oral capsule   SIG: take 1 capsule (400 mg) by oral route once daily for 90 days   DISP: (90) Capsule with 1 refills  Adjusted on 02/25/2021     o cyanocobalamin (vitamin B-12) 1,000 mcg/mL injection solution   SIG: inject 1 milliliter (1,000 mcg) by subcutaneous route once a month for 90 days   DISP: (3) Vial with 1 refills  Adjusted on 02/25/2021     o gabapentin 300 mg oral capsule   SIG: take 1 capsule by oral route once a day (at bedtime) for 90 days   DISP: (90) Capsule with 1 refills  Adjusted on 02/25/2021     o levothyroxine 200 mcg oral tablet   SIG: take 1 tablet (200 mcg) by oral route once daily on an empty stomach 30 minutes before breakfast for 90 days   DISP: (90) Tablet with 1 refills  Adjusted on 02/25/2021     o methocarbamol 500 mg oral tablet   SIG: take 1 tablet by oral route 3 times a day as needed for 30 days muscle pain/spams   DISP: (120) Tablet with 2 refills  Adjusted on 02/25/2021     o zolpidem 10 mg oral tablet   SIG: take 1 tablet (10 mg) by oral route once daily at bedtime for 90 days   DISP: (90) Tablet with 1 refills  Adjusted on 02/25/2021     · Instructions  o Reviewed health maintenance flowsheet and updated information. Orders were placed and/or patient's response was documented.  o Recommended exercise program to assist with cholesterol, weight loss and overall health improvement.  o Advised that cheeses and other sources of dairy fats, animal fats, fast food, and the extras (candy, pastries, pies, doughnuts and cookies) all contain LDL raising nutrients. Advised to  increase fruits, vegetables, whole grains, and to monitor portion sizes.   o Take all medications as prescribed/directed.  o Patient was educated/instructed on their diagnosis, treatment and medications prior to discharge from the clinic today.  o Call the office with any concerns or questions.  · Disposition  o Call or Return if symptoms worsen or persist.  o follow up in 6 months  o follow up as needed  o call the office with any questions or concerns            Electronically Signed by: ARLEEN Perla -Author on February 25, 2021 03:54:00 PM

## 2021-05-15 VITALS
OXYGEN SATURATION: 94 % | BODY MASS INDEX: 35.68 KG/M2 | HEIGHT: 61 IN | WEIGHT: 189 LBS | DIASTOLIC BLOOD PRESSURE: 78 MMHG | HEART RATE: 97 BPM | TEMPERATURE: 97.5 F | SYSTOLIC BLOOD PRESSURE: 128 MMHG

## 2021-05-15 VITALS
TEMPERATURE: 98.6 F | HEIGHT: 61 IN | HEART RATE: 71 BPM | DIASTOLIC BLOOD PRESSURE: 76 MMHG | WEIGHT: 180.25 LBS | BODY MASS INDEX: 34.03 KG/M2 | OXYGEN SATURATION: 99 % | SYSTOLIC BLOOD PRESSURE: 132 MMHG

## 2021-06-02 ENCOUNTER — HOSPITAL ENCOUNTER (OUTPATIENT)
Dept: MAMMOGRAPHY | Facility: HOSPITAL | Age: 61
Discharge: HOME | End: 2021-06-02
Attending: NURSE PRACTITIONER

## 2021-06-03 ENCOUNTER — OFFICE VISIT CONVERTED (OUTPATIENT)
Dept: ORTHOPEDIC SURGERY | Facility: CLINIC | Age: 61
End: 2021-06-03
Attending: PHYSICIAN ASSISTANT

## 2021-06-05 NOTE — PROGRESS NOTES
Progress Note      Patient Name: Wade Loo   Patient ID: [id number]   Sex: Female   YOB: 1960    Primary Care Provider: Mariel BACON   Referring Provider: Mariel BACON    Visit Date: May 14, 2021    Provider: ARLEEN Perla   [place] [facility] [place]   [place] Address: [address] [place] [address], [address] [place] [address]  [id number]   Location Phone: [phone]          Chief Complaint  · referral  · medication refills      History Of Present Illness  Wade Loo is a 60 year old /White female who presents for evaluation and treatment of:      Male who comes in needing new referral.  Patient states she had gastric bypass surgery in , she states her surgeon was Adrien Bear out of [facility].  Seen him in over 10 years, she states overall she had been doing well but over the past several months she has started gaining weight back.  She states she has not changed her diet she still eats low-calorie foods and small portions.  She denies any abdominal pain, nausea or vomiting.  Patient is needing a new referral to see Dr. Oshea and discuss what other options she has.    Patient is also needing medication refill.  Patient is having issues with hot flashes.  She states that she has been taking the Premarin for 21 days on 7 days off.  She states of 7 days she is off the medication she is absolutely miserable and wants to know if she can take them consecutively 30 days.       Past Medical History  Disease Name Date Onset Notes   Broken Bones  --    Depression --  --    Gallstone  --    Hemorrhoid --  --    Hernia  --    Night sweats --  --    Sinus trouble --  --    Thyroid Problems --  --          Past Surgical History  Procedure Name Date Notes    section , ,  --    Gastric Bypass  --    Hysterectomy  --    Tummy harper  --          Medication List  Name Date Started Instructions    amitriptyline 100 mg oral tablet 02/25/2021 take 1 tablet (100 mg) by oral route once daily at bedtime for 90 days   atorvastatin 10 mg oral tablet 02/25/2021 take 1 tablet (10 mg) by oral route once daily at bedtime for 90 days   calcium carbonate-vitamin D3 500 mg(1,250mg) -200 unit oral tablet  take 1 tablet by oral route   Celebrex 400 mg oral capsule 02/25/2021 take 1 capsule (400 mg) by oral route once daily for 90 days   cyanocobalamin (vitamin B-12) 1,000 mcg/mL injection solution 05/14/2021 inject 1 milliliter (1,000 mcg) by subcutaneous route once a month for 90 days   diclofenac sodium 75 mg oral tablet,delayed release (DR/EC) 04/01/2021 take 1 tablet (75 mg) by oral route 2 times per day   gabapentin 300 mg oral capsule 02/25/2021 take 1 capsule by oral route once a day (at bedtime) for 90 days   levothyroxine 200 mcg oral tablet 05/14/2021 take 1 tablet (200 mcg) by oral route once daily on an empty stomach 30 minutes before breakfast for 90 days   methocarbamol 500 mg oral tablet 02/25/2021 take 1 tablet by oral route 3 times a day as needed for 30 days muscle pain/spams   pantoprazole 40 mg oral tablet,delayed release (DR/EC)  take 1 tablet (40 mg) by oral route once daily   Premarin 0.625 mg oral tablet 05/14/2021 take 1 tablet (0.625 mg) by oral route once daily for 90 days   pyridoxine (vitamin B6) 50 mg oral tablet 05/14/2021 take 1 tablet by oral route daily for 90 days   zolpidem 10 mg oral tablet 05/14/2021 take 1 tablet (10 mg) by oral route once daily at bedtime for 90 days         Allergy List  Allergen Name Date Reaction Notes   Zoloft --  --  --          Family Medical History  Disease Name Relative/Age Notes   Stroke Father/  Mother/   grandparent   Heart Disease Father/   sibling, grandparent   Heart Attack (MI)  sibling, grandparent   Diabetes Mother/   child, grandparent         Social History  Finding Status Start/Stop Quantity Notes   lives with spouse --  --/-- --  --    Tobacco  "Former --/-- --  --          Immunizations  NameDate Admin Mfg Trade Name Lot Number Route Inj VIS Given VIS Publication   [id number] [facility] [employer]  NE NE 02/25/2021    Comments:          Review of Systems  · Constitutional  o Admits  o : weight gain  o Denies  o : fever, fatigue, weight loss  · Cardiovascular  o Denies  o : lower extremity edema, claudication, chest pressure, palpitations  · Respiratory  o Denies  o : shortness of breath, wheezing, cough, hemoptysis, dyspnea on exertion  · Gastrointestinal  o Denies  o : nausea, vomiting, diarrhea, constipation, abdominal pain  · Endocrine  o Admits  o : heat intolerance, weight gain, hot flashes  o Denies  o : polyuria, polydipsia, galactorrhea      Vitals  Date Time BP Position Site L\R Cuff Size HR RR TEMP (F) WT  HT  BMI kg/m2 BSA m2 O2 Sat FR L/min FiO2        05/14/2021 02:26 /80 Sitting    87 - R   188lbs 0oz 5'  2\" 34.39 1.93 99 %            Physical Examination  · Constitutional  o Appearance  o : well-nourished, well developed, in no acute distress  · Eyes  o Conjunctivae  o : conjunctivae normal, no exudates present  o Sclerae  o : sclerae white  o Pupils and Irises  o : pupils equal and round, and reactive to light and accomodation bilaterally  o Eyelids/Ocular Adnexae  o : extra ocular movements intact  · Respiratory  o Respiratory Effort  o : breathing unlabored, no accessory muscle use  o Inspection of Chest  o : normal appearance, no retractions  o Auscultation of Lungs  o : normal breath sounds bilaterally  · Cardiovascular  o Heart  o :   § Auscultation of Heart  § : regular rate and rhythm, no murmurs, gallops or rubs  · Neurologic  o Mental Status Examination  o :   § Orientation  § : alert and oriented x3  § Speech/Language  § : normal speech pattern  o Gait and Station  o : normal gait, able to stand without difficulty  · Psychiatric  o Judgement and Insight  o : judgment and insight intact, judgement for everyday " activities and social situations within normal limits, insight intact  o Thought Processes  o : rate of thoughts normal, thought content logical  o Mood and Affect  o : mood normal, affect appropriate              Assessment  · Hx of gastric bypass     V45.86/Z98.84  · Weight gain status post gastric bypass       Abnormal weight gain     783.1/R63.5  Bariatric surgery status     783.1/Z98.84  Will refer her back to her general surgery Adrien Bear  · Hot flashes due to menopause     627.2/N95.1  Discussed with patient for now we will keep her on the medication for 30 consecutive days but will look in the near future about weaning her back off of it. Patient verbalized understanding.      Plan  · Orders  o ACO-39: Current medications updated and reviewed (, 1159F) - - 05/14/2021  o GENERAL SURGERY (GNSUR) - V45.86/Z98.84, 783.1/R63.5, 783.1/Z98.84 - 05/14/2021   wants referal to Joaquin Bear [facility] weight managment  · Medications  o cyanocobalamin (vitamin B-12) 1,000 mcg/mL injection solution   SIG: inject 1 milliliter (1,000 mcg) by subcutaneous route once a month for 90 days   DISP: (3) Vial with 1 refills  Adjusted on 05/14/2021     o levothyroxine 200 mcg oral tablet   SIG: take 1 tablet (200 mcg) by oral route once daily on an empty stomach 30 minutes before breakfast for 90 days   DISP: (90) Tablet with 1 refills  Adjusted on 05/14/2021     o Premarin 0.625 mg oral tablet   SIG: take 1 tablet (0.625 mg) by oral route once daily for 90 days   DISP: (90) Tablet with 1 refills  Adjusted on 05/14/2021     o pyridoxine (vitamin B6) 50 mg oral tablet   SIG: take 1 tablet by oral route daily for 90 days   DISP: (90) Tablet with 1 refills  Adjusted on 05/14/2021     o zolpidem 10 mg oral tablet   SIG: take 1 tablet (10 mg) by oral route once daily at bedtime for 90 days   DISP: (90) Tablet with 1 refills  Adjusted on 05/14/2021     o Medications have been Reconciled  o Transition of Care or Provider  Policy  · Instructions  o Take all medications as prescribed/directed.  o Patient was educated/instructed on their diagnosis, treatment and medications prior to discharge from the clinic today.  · Disposition  o Call or Return if symptoms worsen or persist.  o follow up as needed  o call the office with any questions or concerns            Electronically Signed by: Mariel Craig APRN -Author on May 14, 2021 03:02:26 PM

## 2021-06-05 NOTE — PROGRESS NOTES
Progress Note      Patient Name: Wade Loo   Patient ID: [id number]   Sex: Female   YOB: 1960    Primary Care Provider: Mariel BACON   Referring Provider: Mariel BACON    Visit Date: Shanice 3, 2021    Provider: Vane Solis PA-C   Location: [facility]   Location Address: [address] [place] [address]  [id number]   Location Phone: [phone]          Chief Complaint  · Follow up right knee pain      History Of Present Illness  Wade Loo is a 60 year old /White female who presents today to [facility]. Patient presents today for follow up of right knee pain, right knee osteoarthritis. Patient states that the injection she received in clinic on 21 helped some, but she is still having pain. 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 She has not tried any medication for her pain a this time.       Past Medical History  Broken Bones; Depression; Gallstone; Hemorrhoid; Hernia; Night sweats; Sinus trouble; Thyroid Problems         Past Surgical History   section; Gastric Bypass; Hysterectomy; Tummy tuck         Medication List  amitriptyline 100 mg oral tablet; atorvastatin 10 mg oral tablet; calcium carbonate-vitamin D3 500 mg(1,250mg) -200 unit oral tablet; Celebrex 400 mg oral capsule; cyanocobalamin (vitamin B-12) 1,000 mcg/mL injection solution; diclofenac sodium 75 mg oral tablet,delayed release (DR/EC); gabapentin 300 mg oral capsule; levothyroxine 200 mcg oral tablet; methocarbamol 500 mg oral tablet; pantoprazole 40 mg oral tablet,delayed release (DR/EC); Premarin 0.625 mg oral tablet; pyridoxine (vitamin B6) 50 mg oral tablet; Voltaren 1 % topical gel; zolpidem 10 mg oral tablet         Allergy List  Zoloft       Allergies Reconciled  Family Medical History  Stroke; Heart Disease; Heart Attack (MI); Diabetes         Social History  lives with spouse; Tobacco (Former)         Immunizations  Name Date Admin  "  Influenza 10/01/2020         Review of Systems  · Constitutional  o Denies  o : fever, chills, weight loss  · Cardiovascular  o Denies  o : chest pain, shortness of breath  · Gastrointestinal  o Denies  o : liver disease, heartburn, nausea, blood in stools  · Genitourinary  o Denies  o : painful urination, blood in urine  · Integument  o Denies  o : rash, itching  · Neurologic  o Denies  o : headache, weakness, loss of consciousness  · Musculoskeletal  o Denies  o : painful, swollen joints  · Psychiatric  o Denies  o : drug/alcohol addiction, anxiety, depression      Vitals  Date Time BP Position Site L\R Cuff Size HR RR TEMP (F) WT  HT  BMI kg/m2 BSA m2 O2 Sat FR L/min FiO2 HC       06/03/2021 03:55 PM      87 - R   188lbs 0oz 5'  1\" 35.52 1.92 99 %            Physical Examination  · Constitutional  o Appearance  o : well developed, well-nourished, no obvious deformities present  · Head and Face  o Head  o :   § Inspection  § : normocephalic  o Face  o :   § Inspection  § : no facial lesions  · Eyes  o Conjunctivae  o : conjunctivae normal  o Sclerae  o : sclerae white  · Ears, Nose, Mouth and Throat  o Ears  o :   § External Ears  § : appearance within normal limits  § Hearing  § : intact  o Nose  o :   § External Nose  § : appearance normal  · Neck  o Inspection/Palpation  o : normal appearance  o Range of Motion  o : full range of motion  · Respiratory  o Respiratory Effort  o : breathing unlabored  o Inspection of Chest  o : normal appearance  o Auscultation of Lungs  o : no audible wheezing or rales  · Cardiovascular  o Heart  o : regular rate  · Gastrointestinal  o Abdominal Examination  o : soft and non-tender  · Skin and Subcutaneous Tissue  o General Inspection  o : intact, no rashes  · Psychiatric  o General  o : Alert and oriented x3  o Judgement and Insight  o : judgment and insight intact  o Mood and Affect  o : mood normal, affect appropriate  · Right Knee  o Inspection  o : Tenderness of the medial " and lateral joint line. No swelling, atrophy or deformity. Active range of motion is 0 to 120 degrees flexion. Stable to varus and valgus stress. Normal gait. Sensation intact. Neurovascular intact. Calf supple and nontender.           Assessment  · Primary osteoarthritis of right knee     715.16/M17.11  · Pain: Knee     719.46/M25.569      Plan  · Instructions  o Reviewed the patient's Past Medical, Social, and Family history as well as the ROS at today's visit, no changes.  o Call or return if worsening symptoms.  o Follow up as needed.  o Patient given a brace today to use while at work. Topical anti-inflammatory given for pain. She will follow up if she wishes to receive an injection at the end of July.  · Referrals  o ID: [id number] Date: 03/30/2021 Type: Inbound  Specialty: Orthopedic Surgery            Electronically Signed by: Vane Solis PA-C -Author on Shanice 3, 2021 04:22:04 PM

## 2021-07-01 RX ORDER — LANOLIN ALCOHOL/MO/W.PET/CERES
50 CREAM (GRAM) TOPICAL DAILY
COMMUNITY

## 2021-07-01 RX ORDER — LEVOTHYROXINE SODIUM 0.2 MG/1
200 TABLET ORAL DAILY
COMMUNITY
End: 2021-10-14 | Stop reason: SDUPTHER

## 2021-07-01 RX ORDER — PANTOPRAZOLE SODIUM 40 MG/1
1 TABLET, DELAYED RELEASE ORAL DAILY
COMMUNITY
End: 2021-10-14 | Stop reason: SDUPTHER

## 2021-07-01 RX ORDER — ZOLPIDEM TARTRATE 10 MG/1
10 TABLET ORAL NIGHTLY PRN
COMMUNITY
End: 2022-02-07 | Stop reason: SDUPTHER

## 2021-07-01 RX ORDER — DICLOFENAC SODIUM 75 MG/1
1 TABLET, DELAYED RELEASE ORAL 2 TIMES DAILY
COMMUNITY
Start: 2021-04-01 | End: 2021-12-30

## 2021-07-01 RX ORDER — METHOCARBAMOL 500 MG/1
500 TABLET, FILM COATED ORAL 3 TIMES DAILY
COMMUNITY
End: 2021-10-14 | Stop reason: SDUPTHER

## 2021-07-01 RX ORDER — ATORVASTATIN CALCIUM 10 MG/1
10 TABLET, FILM COATED ORAL DAILY
COMMUNITY
End: 2021-10-14 | Stop reason: SDUPTHER

## 2021-07-01 RX ORDER — CELECOXIB 400 MG/1
400 CAPSULE ORAL DAILY
COMMUNITY
End: 2021-10-14 | Stop reason: SDUPTHER

## 2021-07-01 RX ORDER — GABAPENTIN 300 MG/1
300 CAPSULE ORAL DAILY
COMMUNITY
End: 2022-02-07 | Stop reason: SDUPTHER

## 2021-07-01 RX ORDER — AMITRIPTYLINE HYDROCHLORIDE 100 MG/1
100 TABLET, FILM COATED ORAL NIGHTLY
COMMUNITY
End: 2021-10-14 | Stop reason: SDUPTHER

## 2021-07-02 ENCOUNTER — OFFICE VISIT (OUTPATIENT)
Dept: FAMILY MEDICINE CLINIC | Facility: CLINIC | Age: 61
End: 2021-07-02

## 2021-07-02 VITALS
HEART RATE: 82 BPM | OXYGEN SATURATION: 93 % | WEIGHT: 187 LBS | BODY MASS INDEX: 35.3 KG/M2 | TEMPERATURE: 97.3 F | SYSTOLIC BLOOD PRESSURE: 132 MMHG | HEIGHT: 61 IN | DIASTOLIC BLOOD PRESSURE: 86 MMHG

## 2021-07-02 DIAGNOSIS — Z12.11 SCREEN FOR COLON CANCER: ICD-10-CM

## 2021-07-02 DIAGNOSIS — E78.2 MIXED HYPERLIPIDEMIA: Chronic | ICD-10-CM

## 2021-07-02 DIAGNOSIS — Z98.84 HISTORY OF BARIATRIC SURGERY: ICD-10-CM

## 2021-07-02 DIAGNOSIS — Z09 FOLLOW-UP EXAM, 3-6 MONTHS SINCE PREVIOUS EXAM: Primary | ICD-10-CM

## 2021-07-02 DIAGNOSIS — E03.9 ACQUIRED HYPOTHYROIDISM: ICD-10-CM

## 2021-07-02 LAB
ALBUMIN SERPL-MCNC: 4.3 G/DL (ref 3.5–5.2)
ALBUMIN/GLOB SERPL: 1.6 G/DL
ALP SERPL-CCNC: 72 U/L (ref 39–117)
ALT SERPL W P-5'-P-CCNC: 20 U/L (ref 1–33)
ANION GAP SERPL CALCULATED.3IONS-SCNC: 8.6 MMOL/L (ref 5–15)
AST SERPL-CCNC: 28 U/L (ref 1–32)
BASOPHILS # BLD AUTO: 0.06 10*3/MM3 (ref 0–0.2)
BASOPHILS NFR BLD AUTO: 1.1 % (ref 0–1.5)
BILIRUB SERPL-MCNC: <0.2 MG/DL (ref 0–1.2)
BUN SERPL-MCNC: 21 MG/DL (ref 6–20)
BUN/CREAT SERPL: 23.6 (ref 7–25)
CALCIUM SPEC-SCNC: 8.9 MG/DL (ref 8.6–10.5)
CHLORIDE SERPL-SCNC: 104 MMOL/L (ref 98–107)
CHOLEST SERPL-MCNC: 164 MG/DL (ref 0–200)
CO2 SERPL-SCNC: 24.4 MMOL/L (ref 22–29)
CREAT SERPL-MCNC: 0.89 MG/DL (ref 0.57–1)
DEPRECATED RDW RBC AUTO: 42.7 FL (ref 37–54)
EOSINOPHIL # BLD AUTO: 0.18 10*3/MM3 (ref 0–0.4)
EOSINOPHIL NFR BLD AUTO: 3.3 % (ref 0.3–6.2)
ERYTHROCYTE [DISTWIDTH] IN BLOOD BY AUTOMATED COUNT: 13.1 % (ref 12.3–15.4)
GFR SERPL CREATININE-BSD FRML MDRD: 65 ML/MIN/1.73
GLOBULIN UR ELPH-MCNC: 2.7 GM/DL
GLUCOSE SERPL-MCNC: 94 MG/DL (ref 65–99)
HCT VFR BLD AUTO: 36.6 % (ref 34–46.6)
HDLC SERPL-MCNC: 77 MG/DL (ref 40–60)
HGB BLD-MCNC: 12.5 G/DL (ref 12–15.9)
IMM GRANULOCYTES # BLD AUTO: 0.01 10*3/MM3 (ref 0–0.05)
IMM GRANULOCYTES NFR BLD AUTO: 0.2 % (ref 0–0.5)
LDLC SERPL CALC-MCNC: 66 MG/DL (ref 0–100)
LDLC/HDLC SERPL: 0.82 {RATIO}
LYMPHOCYTES # BLD AUTO: 1.71 10*3/MM3 (ref 0.7–3.1)
LYMPHOCYTES NFR BLD AUTO: 31.4 % (ref 19.6–45.3)
MCH RBC QN AUTO: 30.9 PG (ref 26.6–33)
MCHC RBC AUTO-ENTMCNC: 34.2 G/DL (ref 31.5–35.7)
MCV RBC AUTO: 90.4 FL (ref 79–97)
MONOCYTES # BLD AUTO: 0.4 10*3/MM3 (ref 0.1–0.9)
MONOCYTES NFR BLD AUTO: 7.3 % (ref 5–12)
NEUTROPHILS NFR BLD AUTO: 3.09 10*3/MM3 (ref 1.7–7)
NEUTROPHILS NFR BLD AUTO: 56.7 % (ref 42.7–76)
NRBC BLD AUTO-RTO: 0 /100 WBC (ref 0–0.2)
PLATELET # BLD AUTO: 224 10*3/MM3 (ref 140–450)
PMV BLD AUTO: 11.9 FL (ref 6–12)
POTASSIUM SERPL-SCNC: 4.3 MMOL/L (ref 3.5–5.2)
PROT SERPL-MCNC: 7 G/DL (ref 6–8.5)
RBC # BLD AUTO: 4.05 10*6/MM3 (ref 3.77–5.28)
SODIUM SERPL-SCNC: 137 MMOL/L (ref 136–145)
T-UPTAKE NFR SERPL: 1.12 TBI (ref 0.8–1.3)
T4 SERPL-MCNC: 10.5 MCG/DL (ref 4.5–11.7)
TRIGL SERPL-MCNC: 120 MG/DL (ref 0–150)
TSH SERPL DL<=0.05 MIU/L-ACNC: 1.01 UIU/ML (ref 0.27–4.2)
VLDLC SERPL-MCNC: 21 MG/DL (ref 5–40)
WBC # BLD AUTO: 5.45 10*3/MM3 (ref 3.4–10.8)

## 2021-07-02 PROCEDURE — 85025 COMPLETE CBC W/AUTO DIFF WBC: CPT | Performed by: NURSE PRACTITIONER

## 2021-07-02 PROCEDURE — 80061 LIPID PANEL: CPT | Performed by: NURSE PRACTITIONER

## 2021-07-02 PROCEDURE — 84436 ASSAY OF TOTAL THYROXINE: CPT | Performed by: NURSE PRACTITIONER

## 2021-07-02 PROCEDURE — 80053 COMPREHEN METABOLIC PANEL: CPT | Performed by: NURSE PRACTITIONER

## 2021-07-02 PROCEDURE — 99214 OFFICE O/P EST MOD 30 MIN: CPT | Performed by: NURSE PRACTITIONER

## 2021-07-02 PROCEDURE — 84443 ASSAY THYROID STIM HORMONE: CPT | Performed by: NURSE PRACTITIONER

## 2021-07-02 PROCEDURE — 84479 ASSAY OF THYROID (T3 OR T4): CPT | Performed by: NURSE PRACTITIONER

## 2021-07-15 VITALS — WEIGHT: 188 LBS | OXYGEN SATURATION: 99 % | BODY MASS INDEX: 35.5 KG/M2 | HEART RATE: 87 BPM | HEIGHT: 61 IN

## 2021-07-15 VITALS
SYSTOLIC BLOOD PRESSURE: 142 MMHG | HEIGHT: 62 IN | WEIGHT: 188 LBS | OXYGEN SATURATION: 99 % | HEART RATE: 87 BPM | DIASTOLIC BLOOD PRESSURE: 80 MMHG | BODY MASS INDEX: 34.6 KG/M2

## 2021-10-08 ENCOUNTER — TELEPHONE (OUTPATIENT)
Dept: FAMILY MEDICINE CLINIC | Facility: CLINIC | Age: 61
End: 2021-10-08

## 2021-10-14 ENCOUNTER — OFFICE VISIT (OUTPATIENT)
Dept: FAMILY MEDICINE CLINIC | Facility: CLINIC | Age: 61
End: 2021-10-14

## 2021-10-14 VITALS
OXYGEN SATURATION: 99 % | TEMPERATURE: 97.1 F | SYSTOLIC BLOOD PRESSURE: 120 MMHG | HEART RATE: 77 BPM | HEIGHT: 61 IN | DIASTOLIC BLOOD PRESSURE: 88 MMHG | BODY MASS INDEX: 34.48 KG/M2 | WEIGHT: 182.6 LBS

## 2021-10-14 DIAGNOSIS — E78.2 MIXED HYPERLIPIDEMIA: ICD-10-CM

## 2021-10-14 DIAGNOSIS — Z23 NEED FOR INFLUENZA VACCINATION: ICD-10-CM

## 2021-10-14 DIAGNOSIS — Z12.11 SCREEN FOR COLON CANCER: ICD-10-CM

## 2021-10-14 DIAGNOSIS — M25.50 POLYARTHRALGIA: ICD-10-CM

## 2021-10-14 DIAGNOSIS — K21.9 GASTROESOPHAGEAL REFLUX DISEASE WITHOUT ESOPHAGITIS: ICD-10-CM

## 2021-10-14 DIAGNOSIS — Z09 FOLLOW-UP EXAM, 3-6 MONTHS SINCE PREVIOUS EXAM: Primary | ICD-10-CM

## 2021-10-14 DIAGNOSIS — Z76.0 MEDICATION REFILL: ICD-10-CM

## 2021-10-14 PROCEDURE — 90471 IMMUNIZATION ADMIN: CPT | Performed by: NURSE PRACTITIONER

## 2021-10-14 PROCEDURE — 90686 IIV4 VACC NO PRSV 0.5 ML IM: CPT | Performed by: NURSE PRACTITIONER

## 2021-10-14 PROCEDURE — 99214 OFFICE O/P EST MOD 30 MIN: CPT | Performed by: NURSE PRACTITIONER

## 2021-10-14 RX ORDER — PANTOPRAZOLE SODIUM 40 MG/1
40 TABLET, DELAYED RELEASE ORAL DAILY
Qty: 90 TABLET | Refills: 1 | Status: SHIPPED | OUTPATIENT
Start: 2021-10-14 | End: 2021-12-30 | Stop reason: SDUPTHER

## 2021-10-14 RX ORDER — ATORVASTATIN CALCIUM 10 MG/1
10 TABLET, FILM COATED ORAL DAILY
Qty: 90 TABLET | Refills: 1 | Status: SHIPPED | OUTPATIENT
Start: 2021-10-14 | End: 2021-12-01 | Stop reason: SDUPTHER

## 2021-10-14 RX ORDER — METHOCARBAMOL 500 MG/1
500 TABLET, FILM COATED ORAL 3 TIMES DAILY
Qty: 90 TABLET | Refills: 3 | Status: SHIPPED | OUTPATIENT
Start: 2021-10-14 | End: 2021-12-30 | Stop reason: SDUPTHER

## 2021-10-14 RX ORDER — LEVOTHYROXINE SODIUM 0.2 MG/1
200 TABLET ORAL DAILY
Qty: 90 TABLET | Refills: 1 | Status: SHIPPED | OUTPATIENT
Start: 2021-10-14 | End: 2021-12-30 | Stop reason: SDUPTHER

## 2021-10-14 RX ORDER — CELECOXIB 400 MG/1
400 CAPSULE ORAL DAILY
Qty: 90 CAPSULE | Refills: 1 | Status: SHIPPED | OUTPATIENT
Start: 2021-10-14 | End: 2021-12-30 | Stop reason: SDUPTHER

## 2021-10-14 RX ORDER — AMITRIPTYLINE HYDROCHLORIDE 100 MG/1
100 TABLET, FILM COATED ORAL NIGHTLY
Qty: 90 TABLET | Refills: 1 | Status: SHIPPED | OUTPATIENT
Start: 2021-10-14 | End: 2021-12-30 | Stop reason: SDUPTHER

## 2021-10-14 NOTE — PROGRESS NOTES
Chief Complaint  Follow-up, Thyroid Problem, Neck Pain, Med Refill, and Hyperlipidemia    Subjective        Social History     Socioeconomic History   • Marital status:    Tobacco Use   • Smoking status: Former Smoker     Packs/day: 1.00     Years: 23.00     Pack years: 23.00     Types: Cigarettes     Start date: 3/2/1982     Quit date: 2000     Years since quittin.5   • Smokeless tobacco: Never Used   Substance and Sexual Activity   • Alcohol use: Never   • Drug use: Never     Past Medical History:   Diagnosis Date   • Broken bones    • Condition not found     Thyroid problems    • Depression    • Gallstone    • Hemorrhoid    • Hernia cerebri (HCC)    • Night sweats    • Sinus trouble      Past Surgical History:   Procedure Laterality Date   •  SECTION  1983   • GASTRIC BYPASS     • HYSTERECTOMY     • OTHER SURGICAL HISTORY      Barbara Loo presents to [facility]  Hyperlipidemia  This is a chronic problem. The current episode started more than 1 year ago. The problem is controlled. Recent lipid tests were reviewed and are normal. Exacerbating diseases include hypothyroidism and obesity. She has no history of diabetes. There are no known factors aggravating her hyperlipidemia. Associated symptoms include myalgias. Current antihyperlipidemic treatment includes statins and herbal therapy. The current treatment provides significant improvement of lipids. There are no compliance problems.  Risk factors for coronary artery disease include dyslipidemia, hypertension, obesity and post-menopausal.   Pain  This is a chronic problem. The current episode started more than 1 year ago. The problem occurs daily. The problem has been waxing and waning. Associated symptoms include arthralgias, joint swelling and myalgias. Pertinent negatives include no abdominal pain or sore throat. The symptoms are aggravated by  "exertion, standing and walking. She has tried NSAIDs and relaxation (muscle relaxer) for the symptoms. The treatment provided significant relief.   Hypothyroidism  This is a chronic problem. The current episode started more than 1 year ago. The problem occurs rarely. Associated symptoms include arthralgias, joint swelling and myalgias. Pertinent negatives include no abdominal pain or sore throat. Nothing aggravates the symptoms. Treatments tried: levothyroxin. The treatment provided significant relief.   Heartburn  She complains of heartburn. She reports no abdominal pain or no sore throat. This is a chronic problem. The current episode started more than 1 year ago. The problem occurs rarely. Pertinent negatives include no anemia. Risk factors include obesity. She has tried a PPI for the symptoms. The treatment provided significant relief. Past invasive treatments include gastroplasty.       Objective   Vital Signs:   /88   Pulse 77   Temp 97.1 °F (36.2 °C)   Ht 154.9 cm (61\")   Wt 82.8 kg (182 lb 9.6 oz)   SpO2 99%   BMI 34.50 kg/m²     Physical Exam  Vitals reviewed.   Constitutional:       Appearance: Normal appearance. She is well-developed.   HENT:      Head: Normocephalic and atraumatic.      Mouth/Throat:      Pharynx: No oropharyngeal exudate.   Eyes:      Conjunctiva/sclera: Conjunctivae normal.      Pupils: Pupils are equal, round, and reactive to light.   Cardiovascular:      Rate and Rhythm: Normal rate and regular rhythm.      Heart sounds: No murmur heard.  No friction rub. No gallop.    Pulmonary:      Effort: Pulmonary effort is normal.      Breath sounds: Normal breath sounds. No wheezing or rhonchi.   Musculoskeletal:      Right lower leg: No edema.      Left lower leg: No edema.   Skin:     General: Skin is warm and dry.   Neurological:      Mental Status: She is alert and oriented to person, place, and time.   Psychiatric:         Mood and Affect: Mood and affect normal.         " Behavior: Behavior normal.         Thought Content: Thought content normal.         Judgment: Judgment normal.        Result Review :   The following data was reviewed by: ARLEEN Tierney on 10/14/2021:  CMP    CMP 12/4/20 2/25/21 7/2/21   Glucose   94   Glucose 92 85    BUN 19 23 21 (A)   Creatinine 0.82 0.89 0.89   eGFR Non African Am   65   Sodium 139 137 137   Potassium 4.5 4.3 4.3   Chloride 106 102 104   Calcium 9.0 9.2 8.9   Albumin 4.1 4.2 4.30   Total Bilirubin <0.15 (A) <0.15 (A) <0.2   Alkaline Phosphatase 65 76 72   AST (SGOT) 25 30 28   ALT (SGPT) 23 29 20   (A) Abnormal value            CBC w/diff    CBC w/Diff 12/4/20 2/25/21 7/2/21   WBC 5.43 6.24 5.45   RBC 3.96 (A) 3.98 (A) 4.05   Hemoglobin 12.5 12.2 12.5   Hematocrit 36.9 (A) 36.9 (A) 36.6   MCV 93.2 92.7 90.4   MCH 31.6 (A) 30.7 30.9   MCHC 33.9 33.1 34.2   RDW 12.9 12.5 13.1   Platelets 208 216 224   Neutrophil Rel % 54.9 64.5 56.7   Immature Granulocyte Rel %   0.2   Lymphocyte Rel % 34.4 26.4 31.4   Monocyte Rel % 5.9 5.4 7.3   Eosinophil Rel % 3.5 2.7 3.3   Basophil Rel % 1.1 0.8 1.1   (A) Abnormal value            Lipid Panel    Lipid Panel 2/25/21 7/2/21   Total Cholesterol  164   Total Cholesterol 236 (A)    Triglycerides 167 (A) 120   HDL Cholesterol 87 (A) 77 (A)   VLDL Cholesterol 33 21   LDL Cholesterol  116 (A) 66   LDL/HDL Ratio  0.82   (A) Abnormal value       Comments are available for some flowsheets but are not being displayed.           TSH    TSH 2/25/21 4/2/21 7/2/21   TSH 6.230 (A) 0.604 1.010   (A) Abnormal value                    Data reviewed: previous labs          Assessment and Plan    Diagnoses and all orders for this visit:    1. Follow-up exam, 3-6 months since previous exam (Primary)    2. Need for influenza vaccination  -     FluLaval/Fluarix/Fluzone >6 Months (5500-1362)    3. Screen for colon cancer  Comments:  Will order Cologuard  Orders:  -     Cologuard - Stool, Per Rectum; Future    4. Mixed  hyperlipidemia  Comments:  Cholesterol 3 months ago within normal limits we will continue on Lipitor 10 mg    5. Polyarthralgia  Comments:  Stable on NSAIDs and muscle relaxer patient needing refills on medication.    6. Gastroesophageal reflux disease without esophagitis  Comments:  Stable on Protonix patient needing medication refill    7. Medication refill    Other orders  -     methocarbamol (ROBAXIN) 500 MG tablet; Take 1 tablet by mouth 3 (Three) Times a Day.  Dispense: 90 tablet; Refill: 3  -     amitriptyline (ELAVIL) 100 MG tablet; Take 1 tablet by mouth Every Night.  Dispense: 90 tablet; Refill: 1  -     atorvastatin (LIPITOR) 10 MG tablet; Take 1 tablet by mouth Daily.  Dispense: 90 tablet; Refill: 1  -     estrogens, conjugated, (PREMARIN) 0.625 MG tablet; Take 1 tablet by mouth Daily. Take daily for 21 days then do not take for 7 days.  Dispense: 90 tablet; Refill: 1  -     levothyroxine (SYNTHROID, LEVOTHROID) 200 MCG tablet; Take 1 tablet by mouth Daily.  Dispense: 90 tablet; Refill: 1  -     celecoxib (CeleBREX) 400 MG capsule; Take 1 capsule by mouth Daily.  Dispense: 90 capsule; Refill: 1  -     pantoprazole (PROTONIX) 40 MG EC tablet; Take 1 tablet by mouth Daily.  Dispense: 90 tablet; Refill: 1        Follow Up   Return in about 3 months (around 1/14/2022) for Recheck.  Patient was given instructions and counseling regarding her condition or for health maintenance advice. Please see specific information pulled into the AVS if appropriate.

## 2021-12-01 ENCOUNTER — TELEPHONE (OUTPATIENT)
Dept: FAMILY MEDICINE CLINIC | Facility: CLINIC | Age: 61
End: 2021-12-01

## 2021-12-01 RX ORDER — ATORVASTATIN CALCIUM 10 MG/1
10 TABLET, FILM COATED ORAL DAILY
Qty: 90 TABLET | Refills: 1 | Status: SHIPPED | OUTPATIENT
Start: 2021-12-01 | End: 2021-12-30 | Stop reason: SDUPTHER

## 2021-12-30 RX ORDER — CELECOXIB 400 MG/1
400 CAPSULE ORAL DAILY
Qty: 90 CAPSULE | Refills: 1 | Status: SHIPPED | OUTPATIENT
Start: 2021-12-30 | End: 2022-06-11 | Stop reason: SDUPTHER

## 2021-12-30 RX ORDER — AMITRIPTYLINE HYDROCHLORIDE 100 MG/1
100 TABLET, FILM COATED ORAL NIGHTLY
Qty: 90 TABLET | Refills: 1 | Status: SHIPPED | OUTPATIENT
Start: 2021-12-30 | End: 2022-06-11 | Stop reason: SDUPTHER

## 2021-12-30 RX ORDER — PANTOPRAZOLE SODIUM 40 MG/1
40 TABLET, DELAYED RELEASE ORAL DAILY
Qty: 90 TABLET | Refills: 1 | Status: SHIPPED | OUTPATIENT
Start: 2021-12-30 | End: 2022-06-11 | Stop reason: SDUPTHER

## 2021-12-30 RX ORDER — ATORVASTATIN CALCIUM 10 MG/1
10 TABLET, FILM COATED ORAL DAILY
Qty: 90 TABLET | Refills: 1 | Status: SHIPPED | OUTPATIENT
Start: 2021-12-30 | End: 2022-06-11 | Stop reason: SDUPTHER

## 2021-12-30 RX ORDER — LEVOTHYROXINE SODIUM 0.2 MG/1
200 TABLET ORAL DAILY
Qty: 90 TABLET | Refills: 1 | Status: SHIPPED | OUTPATIENT
Start: 2021-12-30 | End: 2022-06-11 | Stop reason: SDUPTHER

## 2021-12-30 RX ORDER — METHOCARBAMOL 500 MG/1
500 TABLET, FILM COATED ORAL 3 TIMES DAILY
Qty: 90 TABLET | Refills: 3 | Status: SHIPPED | OUTPATIENT
Start: 2021-12-30 | End: 2022-06-11 | Stop reason: SDUPTHER

## 2022-02-05 ENCOUNTER — PATIENT MESSAGE (OUTPATIENT)
Dept: FAMILY MEDICINE CLINIC | Facility: CLINIC | Age: 62
End: 2022-02-05

## 2022-02-07 RX ORDER — ZOLPIDEM TARTRATE 10 MG/1
10 TABLET ORAL NIGHTLY PRN
Qty: 30 TABLET | Refills: 0 | Status: SHIPPED | OUTPATIENT
Start: 2022-02-07 | End: 2022-02-11 | Stop reason: SDUPTHER

## 2022-02-07 RX ORDER — GABAPENTIN 300 MG/1
300 CAPSULE ORAL DAILY
Qty: 30 CAPSULE | Refills: 0 | Status: SHIPPED | OUTPATIENT
Start: 2022-02-07 | End: 2022-02-11 | Stop reason: SDUPTHER

## 2022-02-07 NOTE — TELEPHONE ENCOUNTER
Pt requesting medication via [facility]. Pt informed she is due for an appt. Pt requesting mychart medication when medication is submitted to pharmacy.

## 2022-02-11 RX ORDER — ZOLPIDEM TARTRATE 10 MG/1
10 TABLET ORAL NIGHTLY PRN
Qty: 90 TABLET | Refills: 1 | Status: SHIPPED | OUTPATIENT
Start: 2022-02-11 | End: 2022-02-25 | Stop reason: SDUPTHER

## 2022-02-11 RX ORDER — GABAPENTIN 300 MG/1
300 CAPSULE ORAL DAILY
Qty: 30 CAPSULE | Refills: 0 | Status: SHIPPED | OUTPATIENT
Start: 2022-02-11 | End: 2022-06-11 | Stop reason: SDUPTHER

## 2022-02-11 NOTE — TELEPHONE ENCOUNTER
PATIENT IS REQUESTING A WRITTEN PRESCRIPTION FOR AMBIEN AND GABAPENTIN.  WAS ALREADY FILLED, HE THINKS BUT [facility] DOES NOT HAVE. WANTS TO

## 2022-02-25 ENCOUNTER — OFFICE VISIT (OUTPATIENT)
Dept: FAMILY MEDICINE CLINIC | Facility: CLINIC | Age: 62
End: 2022-02-25

## 2022-02-25 VITALS
BODY MASS INDEX: 34.85 KG/M2 | WEIGHT: 184.6 LBS | SYSTOLIC BLOOD PRESSURE: 136 MMHG | DIASTOLIC BLOOD PRESSURE: 84 MMHG | OXYGEN SATURATION: 99 % | HEIGHT: 61 IN | TEMPERATURE: 97.7 F | HEART RATE: 104 BPM

## 2022-02-25 DIAGNOSIS — E55.9 VITAMIN D DEFICIENCY: ICD-10-CM

## 2022-02-25 DIAGNOSIS — Z01.419 WELL WOMAN EXAM WITH ROUTINE GYNECOLOGICAL EXAM: Primary | ICD-10-CM

## 2022-02-25 DIAGNOSIS — E78.2 MIXED HYPERLIPIDEMIA: ICD-10-CM

## 2022-02-25 LAB
25(OH)D3 SERPL-MCNC: 33.4 NG/ML
ALBUMIN SERPL-MCNC: 4.4 G/DL (ref 3.5–5.2)
ALBUMIN/GLOB SERPL: 1.5 G/DL
ALP SERPL-CCNC: 65 U/L (ref 39–117)
ALT SERPL W P-5'-P-CCNC: 14 U/L (ref 1–33)
ANION GAP SERPL CALCULATED.3IONS-SCNC: 10.5 MMOL/L (ref 5–15)
AST SERPL-CCNC: 18 U/L (ref 1–32)
BASOPHILS # BLD AUTO: 0.05 10*3/MM3 (ref 0–0.2)
BASOPHILS NFR BLD AUTO: 1.1 % (ref 0–1.5)
BILIRUB SERPL-MCNC: 0.2 MG/DL (ref 0–1.2)
BUN SERPL-MCNC: 24 MG/DL (ref 8–23)
BUN/CREAT SERPL: 25.8 (ref 7–25)
CALCIUM SPEC-SCNC: 9.2 MG/DL (ref 8.6–10.5)
CHLORIDE SERPL-SCNC: 102 MMOL/L (ref 98–107)
CHOLEST SERPL-MCNC: 173 MG/DL (ref 0–200)
CO2 SERPL-SCNC: 26.5 MMOL/L (ref 22–29)
CREAT SERPL-MCNC: 0.93 MG/DL (ref 0.57–1)
DEPRECATED RDW RBC AUTO: 41.7 FL (ref 37–54)
EOSINOPHIL # BLD AUTO: 0.11 10*3/MM3 (ref 0–0.4)
EOSINOPHIL NFR BLD AUTO: 2.4 % (ref 0.3–6.2)
ERYTHROCYTE [DISTWIDTH] IN BLOOD BY AUTOMATED COUNT: 12.9 % (ref 12.3–15.4)
GFR SERPL CREATININE-BSD FRML MDRD: 61 ML/MIN/1.73
GLOBULIN UR ELPH-MCNC: 3 GM/DL
GLUCOSE SERPL-MCNC: 83 MG/DL (ref 65–99)
HCT VFR BLD AUTO: 36.1 % (ref 34–46.6)
HDLC SERPL-MCNC: 83 MG/DL (ref 40–60)
HGB BLD-MCNC: 12.3 G/DL (ref 12–15.9)
IMM GRANULOCYTES # BLD AUTO: 0.01 10*3/MM3 (ref 0–0.05)
IMM GRANULOCYTES NFR BLD AUTO: 0.2 % (ref 0–0.5)
LDLC SERPL CALC-MCNC: 72 MG/DL (ref 0–100)
LDLC/HDLC SERPL: 0.84 {RATIO}
LYMPHOCYTES # BLD AUTO: 1.71 10*3/MM3 (ref 0.7–3.1)
LYMPHOCYTES NFR BLD AUTO: 37.8 % (ref 19.6–45.3)
MCH RBC QN AUTO: 30.4 PG (ref 26.6–33)
MCHC RBC AUTO-ENTMCNC: 34.1 G/DL (ref 31.5–35.7)
MCV RBC AUTO: 89.1 FL (ref 79–97)
MONOCYTES # BLD AUTO: 0.33 10*3/MM3 (ref 0.1–0.9)
MONOCYTES NFR BLD AUTO: 7.3 % (ref 5–12)
NEUTROPHILS NFR BLD AUTO: 2.31 10*3/MM3 (ref 1.7–7)
NEUTROPHILS NFR BLD AUTO: 51.2 % (ref 42.7–76)
NRBC BLD AUTO-RTO: 0 /100 WBC (ref 0–0.2)
PLATELET # BLD AUTO: 260 10*3/MM3 (ref 140–450)
PMV BLD AUTO: 11.5 FL (ref 6–12)
POTASSIUM SERPL-SCNC: 4.1 MMOL/L (ref 3.5–5.2)
PROT SERPL-MCNC: 7.4 G/DL (ref 6–8.5)
RBC # BLD AUTO: 4.05 10*6/MM3 (ref 3.77–5.28)
SODIUM SERPL-SCNC: 139 MMOL/L (ref 136–145)
TRIGL SERPL-MCNC: 100 MG/DL (ref 0–150)
TSH SERPL DL<=0.05 MIU/L-ACNC: 0.29 UIU/ML (ref 0.27–4.2)
VLDLC SERPL-MCNC: 18 MG/DL (ref 5–40)
WBC NRBC COR # BLD: 4.52 10*3/MM3 (ref 3.4–10.8)

## 2022-02-25 PROCEDURE — 82306 VITAMIN D 25 HYDROXY: CPT | Performed by: NURSE PRACTITIONER

## 2022-02-25 PROCEDURE — 80061 LIPID PANEL: CPT | Performed by: NURSE PRACTITIONER

## 2022-02-25 PROCEDURE — 85025 COMPLETE CBC W/AUTO DIFF WBC: CPT | Performed by: NURSE PRACTITIONER

## 2022-02-25 PROCEDURE — 99396 PREV VISIT EST AGE 40-64: CPT | Performed by: NURSE PRACTITIONER

## 2022-02-25 PROCEDURE — 80053 COMPREHEN METABOLIC PANEL: CPT | Performed by: NURSE PRACTITIONER

## 2022-02-25 PROCEDURE — 84443 ASSAY THYROID STIM HORMONE: CPT | Performed by: NURSE PRACTITIONER

## 2022-02-25 PROCEDURE — G0123 SCREEN CERV/VAG THIN LAYER: HCPCS | Performed by: NURSE PRACTITIONER

## 2022-02-25 RX ORDER — ZOLPIDEM TARTRATE 10 MG/1
10 TABLET ORAL NIGHTLY PRN
Qty: 90 TABLET | Refills: 1 | Status: SHIPPED | OUTPATIENT
Start: 2022-02-25 | End: 2022-06-11 | Stop reason: SDUPTHER

## 2022-02-25 NOTE — PROGRESS NOTES
"Chief Complaint  Gynecologic Exam    Subjective          Medical History: has a past medical history of Broken bones (), Condition not found, Depression, Gallstone (), Hemorrhoid, Hernia cerebri (HCC) (), Night sweats, and Sinus trouble.     Surgical History: has a past surgical history that includes  section ( 1998 ); Gastric bypass (); Hysterectomy (); and Other surgical history ().     Family History: family history includes Diabetes in her mother and another family member; Heart attack in some other family members; Heart disease in her father; Stroke in her father, mother, and another family member.     Social History: reports that she quit smoking about 21 years ago. Her smoking use included cigarettes. She started smoking about 40 years ago. She has a 23.00 pack-year smoking history. She has never used smokeless tobacco. She reports that she does not drink alcohol and does not use drugs.    Wade Loo presents to [facility]    ENCOUNTER DATE:  2022    Wade Loo / 61 y.o. / female      CHIEF COMPLAINT    Gynecologic Exam      VITALS    /84 (BP Location: Left arm, Patient Position: Sitting, Cuff Size: Adult)   Pulse 104   Temp 97.7 °F (36.5 °C) (Temporal)   Ht 154.9 cm (61\")   Wt 83.7 kg (184 lb 9.6 oz)   SpO2 99%   BMI 34.88 kg/m²     BP Readings from Last 3 Encounters:  22 : 136/84  10/14/21 : 120/88  21 : 132/86    Wt Readings from Last 3 Encounters:  22 : 83.7 kg (184 lb 9.6 oz)  10/14/21 : 82.8 kg (182 lb 9.6 oz)  21 : 84.8 kg (187 lb)    Body mass index is 34.88 kg/m².    MEDICATIONS    Current Outpatient Medications on File Prior to Visit:  amitriptyline (ELAVIL) 100 MG tablet, Take 1 tablet by mouth Every Night., Disp: 90 tablet, Rfl: 1  atorvastatin (LIPITOR) 10 MG tablet, Take 1 tablet by mouth Daily., Disp: 90 tablet, Rfl: 1  Calcium Carbonate-Vitamin D (calcium-vitamin D) " 500-200 MG-UNIT tablet per tablet, Take 1 tablet by mouth Daily., Disp: , Rfl:   celecoxib (CeleBREX) 400 MG capsule, Take 1 capsule by mouth Daily., Disp: 90 capsule, Rfl: 1  Diclofenac Sodium (VOLTAREN) 1 % gel gel, Apply 4 g topically to the appropriate area as directed 4 (Four) Times a Day As Needed., Disp: , Rfl:   estrogens, conjugated, (PREMARIN) 0.625 MG tablet, Take 1 tablet by mouth Daily. Take daily for 21 days then do not take for 7 days., Disp: 90 tablet, Rfl: 1  gabapentin (NEURONTIN) 300 MG capsule, Take 1 capsule by mouth Daily., Disp: 30 capsule, Rfl: 0  levothyroxine (SYNTHROID, LEVOTHROID) 200 MCG tablet, Take 1 tablet by mouth Daily., Disp: 90 tablet, Rfl: 1  methocarbamol (ROBAXIN) 500 MG tablet, Take 1 tablet by mouth 3 (Three) Times a Day., Disp: 90 tablet, Rfl: 3  pantoprazole (PROTONIX) 40 MG EC tablet, Take 1 tablet by mouth Daily., Disp: 90 tablet, Rfl: 1  vitamin B-6 (PYRIDOXINE) 50 MG tablet, Take 50 mg by mouth Daily., Disp: , Rfl:   (DISCONTINUED) zolpidem (AMBIEN) 10 MG tablet, Take 1 tablet by mouth At Night As Needed for Sleep., Disp: 90 tablet, Rfl: 1    No current facility-administered medications on file prior to visit.        HISTORY OF PRESENT ILLNESS    Wade presents for annual health maintenance visit.    Last health maintenance visit: approximately 1 year ago  General health: good  Lifestyle:  Attempting to lose weight?: Yes   Diet: eats moderately healthy  Exercise: walks regularly  Tobacco: Former smoker   Alcohol: does not drink  Work: Retired  Reproductive health:  Sexually active?: Yes   Sexual problems?: No problems  Concern for STD?: No    Sees Gynecologist?: No   Sindhu/Postmenopausal?: Yes   Domestic abuse concerns: No   Depression Screening:     PHQ-2: 0 (Not depressed)  PHQ-9: 0 (Negative screening for depression)    Patient Care Team:  Mariel Craig APRN as PCP - General (Nurse Practitioner)      ALLERGIES  No Known Allergies     PFSH:     The  following portions of the patient's history were reviewed and updated as appropriate: Allergies / Current Medications / Past Medical History / Surgical History / Social History / Family History    PROBLEM LIST   Patient Active Problem List:    History of bariatric surgery      PAST MEDICAL HISTORY  Past Medical History:  1967: Broken bones  No date: Condition not found     Comment:  Thyroid problems   No date: Depression  2011: Gallstone  No date: Hemorrhoid  2014: Hernia cerebri (HCC)  No date: Night sweats  No date: Sinus trouble    SURGICAL HISTORY  Past Surgical History:  1983:  SECTION  2004: GASTRIC BYPASS  2004: HYSTERECTOMY  2007: OTHER SURGICAL HISTORY     Comment:  Barbara Gibbs    SOCIAL HISTORY  Social History   Socioeconomic History     Marital status:    Tobacco Use     Smoking status: Former Smoker       Packs/day: 1.00       Years: 23.00       Pack years: 23       Types: Cigarettes       Start date: 3/2/1982       Quit date: 2000       Years since quittin.9     Smokeless tobacco: Never Used   Substance and Sexual Activity     Alcohol use: Never     Drug use: Never      FAMILY HISTORY  Review of patient's family history indicates:  Problem: Stroke     Relation: Mother         Age of Onset: (Not Specified)  Problem: Diabetes     Relation: Mother         Age of Onset: (Not Specified)  Problem: Stroke     Relation: Father         Age of Onset: (Not Specified)  Problem: Heart disease     Relation: Father         Age of Onset: (Not Specified)  Problem: Stroke     Relation: Other         Age of Onset: (Not Specified)  Problem: Heart attack     Relation: Other         Age of Onset: (Not Specified)  Problem: Diabetes     Relation: Other         Age of Onset: (Not Specified)  Problem: Heart attack     Relation: Other         Age of Onset: (Not Specified)      IMMUNIZATION HISTORY    Immunization History  Administered            Date(s) Administered   COVID-19 (PFIZER) PURPLE  CAP                         03/02/2021 03/29/2021 12/08/2021     FluLaval/Fluarix/Fluzone >6                         10/14/2021     Influenza, Unspecified                         10/01/2020        REVIEW OF SYSTEMS    [unfilled]      Labs:    Lab Results      Component                Value               Date                      NA                       137                 07/02/2021                K                        4.3                 07/02/2021                CALCIUM                  8.9                 07/02/2021                AST                      28                  07/02/2021                ALT                      20                  07/02/2021                BUN                      21 (H)              07/02/2021                CREATININE               0.89                07/02/2021                CREATININE               0.89                02/25/2021                CREATININE               0.82                12/04/2020                EGFRIFNONA               65                  07/02/2021              Lab Results      Component                Value               Date                      TSH                      1.010               07/02/2021                FREET4                   1.8                 04/02/2021              Lab Results      Component                Value               Date                      LDL                      66                  07/02/2021                HDL                      77 (H)              07/02/2021                TRIG                     120                 07/02/2021                CHOLHDLRATIO             2.7 (L)             02/25/2021              Lab Results      Component                Value               Date                      [id number]                 42.6                02/25/2021               Lab Results      Component                Value               Date                      WBC                      5.45                 "07/02/2021                HGB                      12.5                07/02/2021                MCV                      90.4                07/02/2021                PLT                      224                 07/02/2021              No results found for: PROTEIN, GLUCOSEU, BLOODU, NITRITEU, LEUKOCYTESUR    No results found for: HEPCVIRUSABY        ASSESSMENT & PLAN    ANNUAL WELLNESS EXAM / PHYSICAL     HEALTHCARE MAINTENANCE ISSUES    Cancer Screening:  Colon: Initial/Next screening at age: CURRENT  Repeat colon cancer screening: every 10 years  Breast: Recommended monthly self exams; annual professional exam  Mammogram: every 1 year  Cervical: N/A s/p total hysterectomy, patient wanted vaginal exam  Skin: Monthly self skin examination, annual exam by health professional      Lifestyle Counseling:  Lifestyle Modifications: Continue good lifestyle choices/modifications  Safety Issues: Always wear seatbelt, Avoid texting while driving   Use sunscreen, regular skin examination  Recommended annual dental/vision examination.  Emotional/Stress/Sleep: Reviewed and  given when appropriate      Objective   Vital Signs:   /84 (BP Location: Left arm, Patient Position: Sitting, Cuff Size: Adult)   Pulse 104   Temp 97.7 °F (36.5 °C) (Temporal)   Ht 154.9 cm (61\")   Wt 83.7 kg (184 lb 9.6 oz)   SpO2 99%   BMI 34.88 kg/m²     Physical Exam  Vitals reviewed. Exam conducted with a chaperone present (Kajal Napier MA).   Constitutional:       Appearance: Normal appearance. She is well-developed. She is obese.   HENT:      Head: Normocephalic and atraumatic.   Eyes:      Conjunctiva/sclera: Conjunctivae normal.      Pupils: Pupils are equal, round, and reactive to light.   Cardiovascular:      Rate and Rhythm: Normal rate and regular rhythm.      Heart sounds: No murmur heard.      Pulmonary:      Effort: Pulmonary effort is normal.      Breath sounds: Normal breath sounds. No wheezing or rhonchi.   Chest: "   Breasts:      Right: Normal.      Left: Normal.       Genitourinary:     Exam position: Knee-chest position.      Vagina: Normal.      Uterus: Absent.    Skin:     General: Skin is warm and dry.   Neurological:      Mental Status: She is alert and oriented to person, place, and time.   Psychiatric:         Mood and Affect: Mood and affect normal.         Behavior: Behavior normal.         Thought Content: Thought content normal.         Judgment: Judgment normal.        Result Review :   The following data was reviewed by: ARLEEN Tierney on 02/25/2022:  Common labs    Common Labsle 7/2/21 7/2/21 7/2/21    1529 1529 1529   Glucose   94   BUN   21 (A)   Creatinine   0.89   eGFR Non African Am   65   Sodium   137   Potassium   4.3   Chloride   104   Calcium   8.9   Albumin   4.30   Total Bilirubin   <0.2   Alkaline Phosphatase   72   AST (SGOT)   28   ALT (SGPT)   20   WBC 5.45     Hemoglobin 12.5     Hematocrit 36.6     Platelets 224     Total Cholesterol  164    Triglycerides  120    HDL Cholesterol  77 (A)    LDL Cholesterol   66    (A) Abnormal value                        Current Outpatient Medications on File Prior to Visit   Medication Sig Dispense Refill   • amitriptyline (ELAVIL) 100 MG tablet Take 1 tablet by mouth Every Night. 90 tablet 1   • atorvastatin (LIPITOR) 10 MG tablet Take 1 tablet by mouth Daily. 90 tablet 1   • Calcium Carbonate-Vitamin D (calcium-vitamin D) 500-200 MG-UNIT tablet per tablet Take 1 tablet by mouth Daily.     • celecoxib (CeleBREX) 400 MG capsule Take 1 capsule by mouth Daily. 90 capsule 1   • Diclofenac Sodium (VOLTAREN) 1 % gel gel Apply 4 g topically to the appropriate area as directed 4 (Four) Times a Day As Needed.     • estrogens, conjugated, (PREMARIN) 0.625 MG tablet Take 1 tablet by mouth Daily. Take daily for 21 days then do not take for 7 days. 90 tablet 1   • gabapentin (NEURONTIN) 300 MG capsule Take 1 capsule by mouth Daily. 30 capsule 0   •  levothyroxine (SYNTHROID, LEVOTHROID) 200 MCG tablet Take 1 tablet by mouth Daily. 90 tablet 1   • methocarbamol (ROBAXIN) 500 MG tablet Take 1 tablet by mouth 3 (Three) Times a Day. 90 tablet 3   • pantoprazole (PROTONIX) 40 MG EC tablet Take 1 tablet by mouth Daily. 90 tablet 1   • vitamin B-6 (PYRIDOXINE) 50 MG tablet Take 50 mg by mouth Daily.     • [DISCONTINUED] zolpidem (AMBIEN) 10 MG tablet Take 1 tablet by mouth At Night As Needed for Sleep. 90 tablet 1     No current facility-administered medications on file prior to visit.        Assessment and Plan    Diagnoses and all orders for this visit:    1. Well woman exam with routine gynecological exam (Primary)  Comments:  Cervix and uterus absent, did a vaginal exam bladder appears to be in situ, no prolapse present.  No adnexal tenderness.  Orders:  -     CBC & Differential  -     Comprehensive Metabolic Panel  -     Lipid Panel  -     TSH  -     IGP, Rfx Aptima HPV ASCU    2. Vitamin D deficiency  Comments:  History of we will recheck labs today adjust medication if needed  Orders:  -     Vitamin D 25 Hydroxy    3. Mixed hyperlipidemia  Comments:  History of, will check labs, adjust medication if needed  Orders:  -     CBC & Differential  -     Comprehensive Metabolic Panel  -     Lipid Panel    Other orders  -     zolpidem (AMBIEN) 10 MG tablet; Take 1 tablet by mouth At Night As Needed for Sleep.  Dispense: 90 tablet; Refill: 1        Follow Up   Return in about 6 months (around 8/25/2022) for Next scheduled follow up.  Patient was given instructions and counseling regarding her condition or for health maintenance advice. Please see specific information pulled into the AVS if appropriate.

## 2022-03-02 LAB
CONV .: NORMAL
CYTOLOGIST CVX/VAG CYTO: NORMAL
CYTOLOGY CVX/VAG DOC CYTO: NORMAL
CYTOLOGY CVX/VAG DOC THIN PREP: NORMAL
DX ICD CODE: NORMAL
HIV 1 & 2 AB SER-IMP: NORMAL
OTHER STN SPEC: NORMAL
STAT OF ADQ CVX/VAG CYTO-IMP: NORMAL

## 2022-03-26 ENCOUNTER — PATIENT MESSAGE (OUTPATIENT)
Dept: FAMILY MEDICINE CLINIC | Facility: CLINIC | Age: 62
End: 2022-03-26

## 2022-03-26 DIAGNOSIS — Z01.818 PREOPERATIVE CLEARANCE: Primary | ICD-10-CM

## 2022-03-29 NOTE — TELEPHONE ENCOUNTER
From: Wade Loo  To: Mariel Craig, APRN  Sent: 3/26/2022 12:37 PM EDT  Subject: Referral to a Cardiologist    Toshia Craig can you please send me a referral to a Cardiologist for my upcoming breast reduction. Thanks

## 2022-04-28 ENCOUNTER — OFFICE VISIT (OUTPATIENT)
Dept: CARDIOLOGY | Facility: CLINIC | Age: 62
End: 2022-04-28

## 2022-04-28 VITALS
HEIGHT: 61 IN | DIASTOLIC BLOOD PRESSURE: 90 MMHG | SYSTOLIC BLOOD PRESSURE: 152 MMHG | BODY MASS INDEX: 34.93 KG/M2 | WEIGHT: 185 LBS | HEART RATE: 80 BPM

## 2022-04-28 DIAGNOSIS — Z01.810 PRE-OPERATIVE CARDIOVASCULAR EXAMINATION: Primary | ICD-10-CM

## 2022-04-28 DIAGNOSIS — Z82.49 FAMILY HISTORY OF ISCHEMIC HEART DISEASE (IHD): ICD-10-CM

## 2022-04-28 PROCEDURE — 93000 ELECTROCARDIOGRAM COMPLETE: CPT | Performed by: INTERNAL MEDICINE

## 2022-04-28 PROCEDURE — 99203 OFFICE O/P NEW LOW 30 MIN: CPT | Performed by: INTERNAL MEDICINE

## 2022-04-28 NOTE — PROGRESS NOTES
Chief Complaint  No chief complaint on file.      History of Present Illness  Wade Loo presents to [facility] CARDIOLOGY    This is a very pleasant 61-year-old lady with family history of ischemic heart disease was referred to clinic for preoperative cardiac risk assessment.  She is scheduled for breast reduction.  She has no cardiac complaints.  She is physically active without extraordinary limitations.  She has no chest discomfort, dyspnea, orthopnea, edema, palpitations, presyncope or syncope.    Past Medical History:   Diagnosis Date   • Broken bones 1967   • Condition not found     Thyroid problems    • Depression    • Gallstone 2011   • Hemorrhoid    • Hernia cerebri (HCC) 2014   • Hyperlipidemia    • Night sweats    • Sinus trouble          Current Outpatient Medications:   •  amitriptyline (ELAVIL) 100 MG tablet, Take 1 tablet by mouth Every Night., Disp: 90 tablet, Rfl: 1  •  atorvastatin (LIPITOR) 10 MG tablet, Take 1 tablet by mouth Daily., Disp: 90 tablet, Rfl: 1  •  Calcium Carbonate-Vitamin D (calcium-vitamin D) 500-200 MG-UNIT tablet per tablet, Take 1 tablet by mouth Daily., Disp: , Rfl:   •  celecoxib (CeleBREX) 400 MG capsule, Take 1 capsule by mouth Daily., Disp: 90 capsule, Rfl: 1  •  Diclofenac Sodium (VOLTAREN) 1 % gel gel, Apply 4 g topically to the appropriate area as directed 4 (Four) Times a Day As Needed., Disp: , Rfl:   •  estrogens, conjugated, (PREMARIN) 0.625 MG tablet, Take 1 tablet by mouth Daily. Take daily for 21 days then do not take for 7 days., Disp: 90 tablet, Rfl: 1  •  gabapentin (NEURONTIN) 300 MG capsule, Take 1 capsule by mouth Daily., Disp: 30 capsule, Rfl: 0  •  levothyroxine (SYNTHROID, LEVOTHROID) 200 MCG tablet, Take 1 tablet by mouth Daily., Disp: 90 tablet, Rfl: 1  •  methocarbamol (ROBAXIN) 500 MG tablet, Take 1 tablet by mouth 3 (Three) Times a Day., Disp: 90 tablet, Rfl: 3  •  pantoprazole (PROTONIX) 40 MG EC tablet, Take 1 tablet by  "mouth Daily., Disp: 90 tablet, Rfl: 1  •  vitamin B-6 (PYRIDOXINE) 50 MG tablet, Take 50 mg by mouth Daily., Disp: , Rfl:   •  zolpidem (AMBIEN) 10 MG tablet, Take 1 tablet by mouth At Night As Needed for Sleep., Disp: 90 tablet, Rfl: 1    There are no discontinued medications.  No Known Allergies     Social History     Tobacco Use   • Smoking status: Former Smoker     Packs/day: 1.00     Years: 23.00     Pack years: 23.00     Types: Cigarettes     Start date: 3/2/1982     Quit date: 2000     Years since quittin.0   • Smokeless tobacco: Never Used   Vaping Use   • Vaping Use: Never used   Substance Use Topics   • Alcohol use: Never   • Drug use: Never       Family History   Problem Relation Age of Onset   • Stroke Mother    • Diabetes Mother    • Stroke Father    • Heart disease Father    • Stroke Other    • Heart attack Other    • Diabetes Other    • Heart attack Other         Objective     /90 (BP Location: Right arm)   Pulse 80   Ht 154.9 cm (61\")   Wt 83.9 kg (185 lb)   BMI 34.96 kg/m²       Physical Exam  Constitutional:       General: Awake. Not in acute distress.     Appearance: Normal appearance.   Neck:      Vascular: No carotid bruit, hepatojugular reflux or JVD.   Cardiovascular:      Rate and Rhythm: Normal rate and regular rhythm.      Chest Wall: PMI is not displaced.      Heart sounds: Normal heart sounds, S1 normal and S2 normal. No murmur heard.   No friction rub. No gallop. No S3 or S4 sounds.    Pulmonary:      Effort: Pulmonary effort is normal.      Breath sounds: Normal breath sounds. No wheezing, rhonchi or rales.   Ext.      Right lower leg: No edema.      Left lower leg: No edema.   Skin:     General: Skin is warm and dry.      Coloration: Skin is not cyanotic.      Findings: No petechiae or rash.   Neurological:      Mental Status: Alert and oriented x 3  Psychiatric:         Behavior: Behavior is cooperative.       Result Review :     No results found for: PROBNP  CMP  "   CMP 7/2/21 2/25/22   Glucose 94 83   BUN 21 (A) 24 (A)   Creatinine 0.89 0.93   eGFR Non African Am 65 61   Sodium 137 139   Potassium 4.3 4.1   Chloride 104 102   Calcium 8.9 9.2   Albumin 4.30 4.40   Total Bilirubin <0.2 0.2   Alkaline Phosphatase 72 65   AST (SGOT) 28 18   ALT (SGPT) 20 14   (A) Abnormal value            CBC w/diff    CBC w/Diff 7/2/21 2/25/22   WBC 5.45 4.52   RBC 4.05 4.05   Hemoglobin 12.5 12.3   Hematocrit 36.6 36.1   MCV 90.4 89.1   MCH 30.9 30.4   MCHC 34.2 34.1   RDW 13.1 12.9   Platelets 224 260   Neutrophil Rel % 56.7 51.2   Immature Granulocyte Rel % 0.2 0.2   Lymphocyte Rel % 31.4 37.8   Monocyte Rel % 7.3 7.3   Eosinophil Rel % 3.3 2.4   Basophil Rel % 1.1 1.1            Lab Results   Component Value Date    TSH 0.295 02/25/2022      Lab Results   Component Value Date    FREET4 1.8 04/02/2021      No results found for: DDIMERQUANT  No results found for: MG   No results found for: DIGOXIN   No results found for: TROPONINT   No results found for: POCTROP(       Lipid Panel    Lipid Panel 7/2/21 2/25/22   Total Cholesterol 164 173   Triglycerides 120 100   HDL Cholesterol 77 (A) 83 (A)   VLDL Cholesterol 21 18   LDL Cholesterol  66 72   LDL/HDL Ratio 0.82 0.84   (A) Abnormal value                 ECG 12 Lead    Date/Time: 4/28/2022 2:08 PM  Performed by: Edgar Scherer MD  Authorized by: Edgar Scherer MD   Comparison: not compared with previous ECG   Previous ECG: no previous ECG available  Rhythm: sinus rhythm  Rate: normal  QRS axis: normal    Clinical impression: normal ECG              No results found for this or any previous visit.                Diagnoses and all orders for this visit:    1. Pre-operative cardiovascular examination (Primary)  -     ECG 12 Lead    2. Family history of ischemic heart disease (IHD)      Assessment:    Patient is planned to undergo breast reduction surgery which is a low risk surgery.  She has no active cardiac conditions.  Her exam is  benign.  Her ECG is normal.  Blood pressure is mildly elevated but she reports she just had coffee.  Her functional capacity is more than 4 METS.  She is at low risk for perioperative cardiac complications of from a low risk surgery which may proceed as planned.    Patient has family history of ischemic heart disease.  Primary CAD risk factor modification including regular exercise, dyslipidemia control and heart healthy diet is encouraged.      Patient will be seen back on as-needed basis    Follow Up       No follow-ups on file.    Patient was given instructions and counseling regarding her condition or for health maintenance advice. Please see specific information pulled into the AVS if appropriate.

## 2022-06-13 ENCOUNTER — TELEPHONE (OUTPATIENT)
Dept: FAMILY MEDICINE CLINIC | Facility: CLINIC | Age: 62
End: 2022-06-13

## 2022-06-13 DIAGNOSIS — E53.8 VITAMIN B12 DEFICIENCY: Primary | ICD-10-CM

## 2022-06-13 RX ORDER — AMITRIPTYLINE HYDROCHLORIDE 100 MG/1
100 TABLET, FILM COATED ORAL NIGHTLY
Qty: 90 TABLET | Refills: 1 | Status: SHIPPED | OUTPATIENT
Start: 2022-06-13 | End: 2022-10-17 | Stop reason: SDUPTHER

## 2022-06-13 RX ORDER — CELECOXIB 400 MG/1
400 CAPSULE ORAL DAILY
Qty: 90 CAPSULE | Refills: 1 | Status: SHIPPED | OUTPATIENT
Start: 2022-06-13

## 2022-06-13 RX ORDER — ATORVASTATIN CALCIUM 10 MG/1
10 TABLET, FILM COATED ORAL DAILY
Qty: 90 TABLET | Refills: 1 | Status: SHIPPED | OUTPATIENT
Start: 2022-06-13 | End: 2022-10-17 | Stop reason: SDUPTHER

## 2022-06-13 RX ORDER — METHOCARBAMOL 500 MG/1
500 TABLET, FILM COATED ORAL 3 TIMES DAILY
Qty: 90 TABLET | Refills: 3 | Status: SHIPPED | OUTPATIENT
Start: 2022-06-13 | End: 2022-10-17 | Stop reason: SDUPTHER

## 2022-06-13 RX ORDER — GABAPENTIN 300 MG/1
300 CAPSULE ORAL DAILY
Qty: 30 CAPSULE | Refills: 0 | Status: SHIPPED | OUTPATIENT
Start: 2022-06-13 | End: 2022-06-15 | Stop reason: SDUPTHER

## 2022-06-13 RX ORDER — PANTOPRAZOLE SODIUM 40 MG/1
40 TABLET, DELAYED RELEASE ORAL DAILY
Qty: 90 TABLET | Refills: 1 | Status: SHIPPED | OUTPATIENT
Start: 2022-06-13 | End: 2022-10-17 | Stop reason: SDUPTHER

## 2022-06-13 RX ORDER — ZOLPIDEM TARTRATE 10 MG/1
10 TABLET ORAL NIGHTLY PRN
Qty: 90 TABLET | Refills: 1 | Status: SHIPPED | OUTPATIENT
Start: 2022-06-13 | End: 2022-09-19 | Stop reason: SDUPTHER

## 2022-06-13 RX ORDER — LEVOTHYROXINE SODIUM 0.2 MG/1
200 TABLET ORAL DAILY
Qty: 90 TABLET | Refills: 1 | Status: SHIPPED | OUTPATIENT
Start: 2022-06-13 | End: 2022-10-17 | Stop reason: SDUPTHER

## 2022-06-15 RX ORDER — CYANOCOBALAMIN 1000 UG/ML
1000 INJECTION, SOLUTION INTRAMUSCULAR; SUBCUTANEOUS
Qty: 3 ML | Refills: 0 | Status: SHIPPED | OUTPATIENT
Start: 2022-06-15 | End: 2022-10-17 | Stop reason: SDUPTHER

## 2022-06-15 RX ORDER — GABAPENTIN 300 MG/1
300 CAPSULE ORAL DAILY
Qty: 90 CAPSULE | Refills: 0 | Status: SHIPPED | OUTPATIENT
Start: 2022-06-15 | End: 2022-10-17 | Stop reason: SDUPTHER

## 2022-08-11 ENCOUNTER — OFFICE VISIT (OUTPATIENT)
Dept: FAMILY MEDICINE CLINIC | Facility: CLINIC | Age: 62
End: 2022-08-11

## 2022-08-11 VITALS
TEMPERATURE: 96 F | BODY MASS INDEX: 35.53 KG/M2 | OXYGEN SATURATION: 99 % | HEART RATE: 81 BPM | HEIGHT: 61 IN | WEIGHT: 188.2 LBS | SYSTOLIC BLOOD PRESSURE: 124 MMHG | DIASTOLIC BLOOD PRESSURE: 72 MMHG

## 2022-08-11 DIAGNOSIS — E78.2 MIXED HYPERLIPIDEMIA: ICD-10-CM

## 2022-08-11 DIAGNOSIS — E55.9 VITAMIN D DEFICIENCY: ICD-10-CM

## 2022-08-11 DIAGNOSIS — Z00.00 ANNUAL PHYSICAL EXAM: Primary | ICD-10-CM

## 2022-08-11 DIAGNOSIS — E03.9 ACQUIRED HYPOTHYROIDISM: ICD-10-CM

## 2022-08-11 DIAGNOSIS — E53.8 VITAMIN B12 DEFICIENCY: ICD-10-CM

## 2022-08-11 DIAGNOSIS — Z11.59 ENCOUNTER FOR HEPATITIS C SCREENING TEST FOR LOW RISK PATIENT: ICD-10-CM

## 2022-08-11 LAB
25(OH)D3 SERPL-MCNC: 39.4 NG/ML (ref 30–100)
ALBUMIN SERPL-MCNC: 4.1 G/DL (ref 3.5–5.2)
ALBUMIN/GLOB SERPL: 1.4 G/DL
ALP SERPL-CCNC: 56 U/L (ref 39–117)
ALT SERPL W P-5'-P-CCNC: 26 U/L (ref 1–33)
ANION GAP SERPL CALCULATED.3IONS-SCNC: 10 MMOL/L (ref 5–15)
AST SERPL-CCNC: 23 U/L (ref 1–32)
BASOPHILS # BLD AUTO: 0.05 10*3/MM3 (ref 0–0.2)
BASOPHILS NFR BLD AUTO: 1.3 % (ref 0–1.5)
BILIRUB SERPL-MCNC: <0.2 MG/DL (ref 0–1.2)
BUN SERPL-MCNC: 22 MG/DL (ref 8–23)
BUN/CREAT SERPL: 27.2 (ref 7–25)
CALCIUM SPEC-SCNC: 9.1 MG/DL (ref 8.6–10.5)
CHLORIDE SERPL-SCNC: 105 MMOL/L (ref 98–107)
CHOLEST SERPL-MCNC: 157 MG/DL (ref 0–200)
CO2 SERPL-SCNC: 25 MMOL/L (ref 22–29)
CREAT SERPL-MCNC: 0.81 MG/DL (ref 0.57–1)
DEPRECATED RDW RBC AUTO: 42.7 FL (ref 37–54)
EGFRCR SERPLBLD CKD-EPI 2021: 83.2 ML/MIN/1.73
EOSINOPHIL # BLD AUTO: 0.18 10*3/MM3 (ref 0–0.4)
EOSINOPHIL NFR BLD AUTO: 4.7 % (ref 0.3–6.2)
ERYTHROCYTE [DISTWIDTH] IN BLOOD BY AUTOMATED COUNT: 13 % (ref 12.3–15.4)
FOLATE SERPL-MCNC: 14 NG/ML (ref 4.78–24.2)
GLOBULIN UR ELPH-MCNC: 2.9 GM/DL
GLUCOSE SERPL-MCNC: 83 MG/DL (ref 65–99)
HCT VFR BLD AUTO: 35.3 % (ref 34–46.6)
HCV AB SER DONR QL: NORMAL
HDLC SERPL-MCNC: 79 MG/DL (ref 40–60)
HGB BLD-MCNC: 12 G/DL (ref 12–15.9)
LDLC SERPL CALC-MCNC: 59 MG/DL (ref 0–100)
LDLC/HDLC SERPL: 0.72 {RATIO}
LYMPHOCYTES # BLD AUTO: 1.42 10*3/MM3 (ref 0.7–3.1)
LYMPHOCYTES NFR BLD AUTO: 37.3 % (ref 19.6–45.3)
MCH RBC QN AUTO: 30.5 PG (ref 26.6–33)
MCHC RBC AUTO-ENTMCNC: 34 G/DL (ref 31.5–35.7)
MCV RBC AUTO: 89.8 FL (ref 79–97)
MONOCYTES # BLD AUTO: 0.31 10*3/MM3 (ref 0.1–0.9)
MONOCYTES NFR BLD AUTO: 8.1 % (ref 5–12)
NEUTROPHILS NFR BLD AUTO: 1.84 10*3/MM3 (ref 1.7–7)
NEUTROPHILS NFR BLD AUTO: 48.3 % (ref 42.7–76)
PLATELET # BLD AUTO: 217 10*3/MM3 (ref 140–450)
PMV BLD AUTO: 11.7 FL (ref 6–12)
POTASSIUM SERPL-SCNC: 4.2 MMOL/L (ref 3.5–5.2)
PROT SERPL-MCNC: 7 G/DL (ref 6–8.5)
RBC # BLD AUTO: 3.93 10*6/MM3 (ref 3.77–5.28)
SODIUM SERPL-SCNC: 140 MMOL/L (ref 136–145)
T-UPTAKE NFR SERPL: 1.01 TBI (ref 0.8–1.3)
T4 SERPL-MCNC: 11.3 MCG/DL (ref 4.5–11.7)
TRIGL SERPL-MCNC: 107 MG/DL (ref 0–150)
TSH SERPL DL<=0.05 MIU/L-ACNC: 0.11 UIU/ML (ref 0.27–4.2)
VIT B12 BLD-MCNC: 365 PG/ML (ref 211–946)
VLDLC SERPL-MCNC: 19 MG/DL (ref 5–40)
WBC NRBC COR # BLD: 3.81 10*3/MM3 (ref 3.4–10.8)

## 2022-08-11 PROCEDURE — 80061 LIPID PANEL: CPT | Performed by: NURSE PRACTITIONER

## 2022-08-11 PROCEDURE — 99396 PREV VISIT EST AGE 40-64: CPT | Performed by: NURSE PRACTITIONER

## 2022-08-11 PROCEDURE — 84479 ASSAY OF THYROID (T3 OR T4): CPT | Performed by: NURSE PRACTITIONER

## 2022-08-11 PROCEDURE — 84443 ASSAY THYROID STIM HORMONE: CPT | Performed by: NURSE PRACTITIONER

## 2022-08-11 PROCEDURE — 82306 VITAMIN D 25 HYDROXY: CPT | Performed by: NURSE PRACTITIONER

## 2022-08-11 PROCEDURE — 82746 ASSAY OF FOLIC ACID SERUM: CPT | Performed by: NURSE PRACTITIONER

## 2022-08-11 PROCEDURE — 85027 COMPLETE CBC AUTOMATED: CPT | Performed by: NURSE PRACTITIONER

## 2022-08-11 PROCEDURE — 84436 ASSAY OF TOTAL THYROXINE: CPT | Performed by: NURSE PRACTITIONER

## 2022-08-11 PROCEDURE — 82607 VITAMIN B-12: CPT | Performed by: NURSE PRACTITIONER

## 2022-08-11 PROCEDURE — 80053 COMPREHEN METABOLIC PANEL: CPT | Performed by: NURSE PRACTITIONER

## 2022-08-11 PROCEDURE — 86803 HEPATITIS C AB TEST: CPT | Performed by: NURSE PRACTITIONER

## 2022-08-11 RX ORDER — AMITRIPTYLINE HYDROCHLORIDE 50 MG/1
TABLET, FILM COATED ORAL
COMMUNITY
Start: 2022-07-19 | End: 2022-08-11

## 2022-08-11 RX ORDER — CELECOXIB 200 MG/1
CAPSULE ORAL
COMMUNITY
Start: 2022-07-19 | End: 2022-08-11

## 2022-08-11 NOTE — PROGRESS NOTES
Chief Complaint  Annual Exam    Subjective          Medical History: has a past medical history of Broken bones (), Condition not found, Depression, Gallstone (), Hemorrhoid, Hernia cerebri (HCC) (), Hyperlipidemia, Night sweats, and Sinus trouble.     Surgical History: has a past surgical history that includes  section ( 1998 ); Gastric bypass (); Hysterectomy (); and Other surgical history ().     Family History: family history includes Diabetes in her mother and another family member; Heart attack in some other family members; Heart disease in her father; Stroke in her father, mother, and another family member.     Social History: reports that she quit smoking about 22 years ago. Her smoking use included cigarettes. She started smoking about 40 years ago. She has a 23.00 pack-year smoking history. She has never used smokeless tobacco. She reports that she does not drink alcohol and does not use drugs.    Wade Loo presents to [facility] FAM[facility]    ENCOUNTER DATE:  2022    Wade Loo / 61 y.o. / female      CHIEF COMPLAINT    Follow-up and Annual Exam      VITALS    There were no vitals taken for this visit.    BP Readings from Last 3 Encounters:  22 : 152/90  22 : 136/84  10/14/21 : 120/88    Wt Readings from Last 3 Encounters:  22 : 83.9 kg (185 lb)  22 : 83.7 kg (184 lb 9.6 oz)  10/14/21 : 82.8 kg (182 lb 9.6 oz)    There is no height or weight on file to calculate BMI.    MEDICATIONS    Current Outpatient Medications on File Prior to Visit:  atorvastatin (LIPITOR) 10 MG tablet, Take 1 tablet by mouth Daily., Disp: 90 tablet, Rfl: 1  Calcium Carbonate-Vitamin D (calcium-vitamin D) 500-200 MG-UNIT tablet per tablet, Take 1 tablet by mouth Daily., Disp: , Rfl:   celecoxib (CeleBREX) 400 MG capsule, Take 1 capsule by mouth Daily., Disp: 90 capsule, Rfl: 1  cyanocobalamin 1000 MCG/ML injection, Inject 1 mL into  "the appropriate muscle as directed by prescriber Every 30 (Thirty) Days., Disp: 3 mL, Rfl: 0  Diclofenac Sodium (VOLTAREN) 1 % gel gel, Apply 4 g topically to the appropriate area as directed 4 (Four) Times a Day As Needed., Disp: , Rfl:   estrogens, conjugated, (PREMARIN) 0.625 MG tablet, Take 1 tablet by mouth Daily. Take daily for 21 days then do not take for 7 days., Disp: 90 tablet, Rfl: 1  gabapentin (NEURONTIN) 300 MG capsule, Take 1 capsule by mouth Daily., Disp: 90 capsule, Rfl: 0  levothyroxine (SYNTHROID, LEVOTHROID) 200 MCG tablet, Take 1 tablet by mouth Daily., Disp: 90 tablet, Rfl: 1  methocarbamol (ROBAXIN) 500 MG tablet, Take 1 tablet by mouth 3 (Three) Times a Day., Disp: 90 tablet, Rfl: 3  pantoprazole (PROTONIX) 40 MG EC tablet, Take 1 tablet by mouth Daily., Disp: 90 tablet, Rfl: 1  Syringe 25G X 1-1/2\" 3 ML misc, 1 each Every 30 (Thirty) Days., Disp: 100 each, Rfl: 0  vitamin B-6 (PYRIDOXINE) 50 MG tablet, Take 50 mg by mouth Daily., Disp: , Rfl:   zolpidem (AMBIEN) 10 MG tablet, Take 1 tablet by mouth At Night As Needed for Sleep., Disp: 90 tablet, Rfl: 1  amitriptyline (ELAVIL) 100 MG tablet, Take 1 tablet by mouth Every Night., Disp: 90 tablet, Rfl: 1  (DISCONTINUED) amitriptyline (ELAVIL) 50 MG tablet, , Disp: , Rfl:   (DISCONTINUED) celecoxib (CeleBREX) 200 MG capsule, , Disp: , Rfl:     No current facility-administered medications on file prior to visit.        HISTORY OF PRESENT ILLNESS    Wade presents for annual health maintenance visit.    Last health maintenance visit: approximately 1 year ago  General health: good  Lifestyle:  Attempting to lose weight?: Yes   Diet: eats decently  Exercise: very active at work/home  Tobacco: Former smoker   Alcohol: does not drink  Work: Full-time  Reproductive health:  Sexually active?: Yes   Sexual problems?: No problems  Concern for STD?: No    Sees Gynecologist?: No   Sindhu/Postmenopausal?: Yes   Domestic abuse concerns: No   Depression " Screening:       PHQ-2: 0 (Not depressed)  PHQ-9: 0 (Negative screening for depression)    Patient Care Team:  Mariel Craig APRN as PCP - General (Nurse Practitioner)      ALLERGIES  -- Zoloft (Sertraline Hcl) -- Hives     PFSH:     The following portions of the patient's history were reviewed and updated as appropriate: Allergies / Current Medications / Past Medical History / Surgical History / Social History / Family History    PROBLEM LIST   Patient Active Problem List:    History of bariatric surgery      PAST MEDICAL HISTORY  Past Medical History:  : Broken bones  No date: Condition not found     Comment:  Thyroid problems   No date: Depression  2011: Gallstone  No date: Hemorrhoid  2014: Hernia cerebri (HCC)  No date: Hyperlipidemia  No date: Night sweats  No date: Sinus trouble    SURGICAL HISTORY  Past Surgical History:  1983:  SECTION  2004: GASTRIC BYPASS  2004: HYSTERECTOMY  2007: OTHER SURGICAL HISTORY     Comment:  Barbara Gibbs    SOCIAL HISTORY  Social History   Socioeconomic History     Marital status:    Tobacco Use     Smoking status: Former Smoker       Packs/day: 1.00       Years: 23.00       Pack years: 23       Types: Cigarettes       Start date: 3/2/1982       Quit date: 2000       Years since quittin.3     Smokeless tobacco: Never Used   Vaping Use     Vaping Use: Never used   Substance and Sexual Activity     Alcohol use: Never     Drug use: Never      FAMILY HISTORY  Review of patient's family history indicates:  Problem: Stroke     Relation: Mother         Age of Onset: (Not Specified)  Problem: Diabetes     Relation: Mother         Age of Onset: (Not Specified)  Problem: Stroke     Relation: Father         Age of Onset: (Not Specified)  Problem: Heart disease     Relation: Father         Age of Onset: (Not Specified)  Problem: Stroke     Relation: Other         Age of Onset: (Not Specified)  Problem: Heart attack     Relation: Other         Age  of Onset: (Not Specified)  Problem: Diabetes     Relation: Other         Age of Onset: (Not Specified)  Problem: Heart attack     Relation: Other         Age of Onset: (Not Specified)      IMMUNIZATION HISTORY    Immunization History  Administered            Date(s) Administered   COVID-19 (PFIZER) PURPLE CAP                         03/02/2021 03/29/2021 12/08/2021     FluLaval/Fluarix/Fluzone >6                         10/14/2021     Influenza, Unspecified                         10/01/2020     Shingrix              03/02/2022      Labs:    Lab Results      Component                Value               Date                      NA                       139                 02/25/2022                K                        4.1                 02/25/2022                CALCIUM                  9.2                 02/25/2022                AST                      18                  02/25/2022                ALT                      14                  02/25/2022                BUN                      24 (H)              02/25/2022                CREATININE               0.93                02/25/2022                CREATININE               0.89                07/02/2021                CREATININE               0.89                02/25/2021                EGFRIFNONA               61                  02/25/2022              Lab Results      Component                Value               Date                      TSH                      0.295               02/25/2022                FREET4                   1.8                 04/02/2021              Lab Results      Component                Value               Date                      LDL                      72                  02/25/2022                HDL                      83 (H)              02/25/2022                TRIG                     100                 02/25/2022                CHOLHDLRATIO             2.7 (L)             02/25/2021              Lab  Results      Component                Value               Date                      [id number]                 33.4                02/25/2022               Lab Results      Component                Value               Date                      WBC                      4.52                02/25/2022                HGB                      12.3                02/25/2022                MCV                      89.1                02/25/2022                PLT                      260                 02/25/2022              No results found for: PROTEIN, GLUCOSEU, BLOODU, NITRITEU, LEUKOCYTESUR    No results found for: HEPCVIRUSABY        ASSESSMENT & PLAN    ANNUAL WELLNESS EXAM / PHYSICAL     HEALTHCARE MAINTENANCE ISSUES    Cancer Screening:  Colon: Initial/Next screening at age: CURRENT  Repeat colon cancer screening: every 3 years  Breast: Recommended monthly self exams; annual professional exam  Mammogram: every 1 year  Cervical: 3 years  Skin: Monthly self skin examination, annual exam by health professional      Lifestyle Counseling:  Lifestyle Modifications: Attempt to lose weight, Continue good lifestyle choices/modifications and Improve dietary compliance  Safety Issues: Always wear seatbelt, Avoid texting while driving   Use sunscreen, regular skin examination  Recommended annual dental/vision examination.  Emotional/Stress/Sleep: Reviewed and  given when appropriate        Hyperlipidemia  This is a chronic problem. The current episode started more than 1 year ago. The problem is controlled. Recent lipid tests were reviewed and are normal. Exacerbating diseases include hypothyroidism and obesity. She has no history of diabetes. There are no known factors aggravating her hyperlipidemia. Associated symptoms include myalgias. Current antihyperlipidemic treatment includes statins and herbal therapy. The current treatment provides significant improvement of lipids. There are no compliance problems.  Risk  "factors for coronary artery disease include dyslipidemia, hypertension, obesity and post-menopausal.       Heartburn  She complains of heartburn. She reports no abdominal pain or no sore throat. This is a chronic problem. The current episode started more than 1 year ago. The problem occurs rarely. Pertinent negatives include no anemia. Risk factors include obesity. She has tried a PPI for the symptoms. The treatment provided significant relief. Past invasive treatments include gastroplasty.       Hypothyroidism  This is a chronic problem. The current episode started more than 1 year ago. The problem occurs rarely. Associated symptoms include arthralgias, joint swelling and myalgias. Pertinent negatives include no abdominal pain or sore throat. Nothing aggravates the symptoms. Treatments tried: levothyroxin. The treatment provided significant relief.       Patient is going to have elective breast reduction, she is scheduled August 29, 2022 with a provider out of [facility].  Patient is needing preoperative clearance before surgery.  The office was contacted by Homero Campbell medical assistant, office is to fax down the preoperative clearance form.      Objective   Vital Signs:   /72   Pulse 81   Temp 96 °F (35.6 °C)   Ht 154.9 cm (61\")   Wt 85.4 kg (188 lb 3.2 oz)   SpO2 99%   BMI 35.56 kg/m²     Physical Exam  Vitals reviewed.   Constitutional:       Appearance: Normal appearance. She is well-developed. She is obese.   HENT:      Head: Normocephalic and atraumatic.   Eyes:      Conjunctiva/sclera: Conjunctivae normal.      Pupils: Pupils are equal, round, and reactive to light.   Cardiovascular:      Rate and Rhythm: Normal rate and regular rhythm.      Heart sounds: No murmur heard.  Pulmonary:      Effort: Pulmonary effort is normal.      Breath sounds: Normal breath sounds. No wheezing or rhonchi.   Skin:     General: Skin is warm and dry.   Neurological:      Mental Status: She is alert and " oriented to person, place, and time.   Psychiatric:         Mood and Affect: Mood and affect normal.         Behavior: Behavior normal.         Thought Content: Thought content normal.         Judgment: Judgment normal.        Result Review :   The following data was reviewed by: ARLEEN Tierney on 08/11/2022:  Common labs    Common Labsle 2/25/22 2/25/22 2/25/22    1442 1442 1442   Glucose  83    BUN  24 (A)    Creatinine  0.93    eGFR Non African Am  61    Sodium  139    Potassium  4.1    Chloride  102    Calcium  9.2    Albumin  4.40    Total Bilirubin  0.2    Alkaline Phosphatase  65    AST (SGOT)  18    ALT (SGPT)  14    WBC 4.52     Hemoglobin 12.3     Hematocrit 36.1     Platelets 260     Total Cholesterol   173   Triglycerides   100   HDL Cholesterol   83 (A)   LDL Cholesterol    72   (A) Abnormal value            Data reviewed: cardiology note            Current Outpatient Medications on File Prior to Visit   Medication Sig Dispense Refill   • atorvastatin (LIPITOR) 10 MG tablet Take 1 tablet by mouth Daily. 90 tablet 1   • Calcium Carbonate-Vitamin D (calcium-vitamin D) 500-200 MG-UNIT tablet per tablet Take 1 tablet by mouth Daily.     • celecoxib (CeleBREX) 400 MG capsule Take 1 capsule by mouth Daily. 90 capsule 1   • cyanocobalamin 1000 MCG/ML injection Inject 1 mL into the appropriate muscle as directed by prescriber Every 30 (Thirty) Days. 3 mL 0   • Diclofenac Sodium (VOLTAREN) 1 % gel gel Apply 4 g topically to the appropriate area as directed 4 (Four) Times a Day As Needed.     • estrogens, conjugated, (PREMARIN) 0.625 MG tablet Take 1 tablet by mouth Daily. Take daily for 21 days then do not take for 7 days. 90 tablet 1   • gabapentin (NEURONTIN) 300 MG capsule Take 1 capsule by mouth Daily. 90 capsule 0   • levothyroxine (SYNTHROID, LEVOTHROID) 200 MCG tablet Take 1 tablet by mouth Daily. 90 tablet 1   • methocarbamol (ROBAXIN) 500 MG tablet Take 1 tablet by mouth 3 (Three) Times a  "Day. 90 tablet 3   • pantoprazole (PROTONIX) 40 MG EC tablet Take 1 tablet by mouth Daily. 90 tablet 1   • Syringe 25G X 1-1/2\" 3 ML misc 1 each Every 30 (Thirty) Days. 100 each 0   • vitamin B-6 (PYRIDOXINE) 50 MG tablet Take 50 mg by mouth Daily.     • zolpidem (AMBIEN) 10 MG tablet Take 1 tablet by mouth At Night As Needed for Sleep. 90 tablet 1   • amitriptyline (ELAVIL) 100 MG tablet Take 1 tablet by mouth Every Night. 90 tablet 1   • [DISCONTINUED] amitriptyline (ELAVIL) 50 MG tablet      • [DISCONTINUED] celecoxib (CeleBREX) 200 MG capsule        No current facility-administered medications on file prior to visit.        Assessment and Plan    Diagnoses and all orders for this visit:    1. Annual physical exam (Primary)  Comments:  Doing well, no significant complaints patient and the labs checked and medication refilled.  Orders:  -     CBC With Manual Differential  -     Comprehensive Metabolic Panel  -     Lipid Panel    2. Encounter for hepatitis C screening test for low risk patient  -     Hepatitis C antibody    3. Vitamin D deficiency  -     Vitamin D 25 Hydroxy    4. Acquired hypothyroidism  Comments:  Recheck labs, adjust medication if needed  Orders:  -     Thyroid Panel With TSH    5. Vitamin B12 deficiency  -     Vitamin B12  -     Folate    6. Mixed hyperlipidemia  Comments:  Recheck labs, adjust medication if needed        Follow Up   No follow-ups on file.  Patient was given instructions and counseling regarding her condition or for health maintenance advice. Please see specific information pulled into the AVS if appropriate.       "

## 2022-08-12 DIAGNOSIS — E03.9 ACQUIRED HYPOTHYROIDISM: Primary | ICD-10-CM

## 2022-09-15 ENCOUNTER — TELEPHONE (OUTPATIENT)
Dept: FAMILY MEDICINE CLINIC | Facility: CLINIC | Age: 62
End: 2022-09-15

## 2022-09-15 NOTE — TELEPHONE ENCOUNTER
PATIENT STATED SHE TRIED TO CALL IN HER AMBIEN, THE PHARMACY STATED IT HAS BEEN DISCONTINUED. PLEASE ADVISE.

## 2022-09-19 RX ORDER — ZOLPIDEM TARTRATE 10 MG/1
10 TABLET ORAL NIGHTLY PRN
Qty: 90 TABLET | Refills: 1 | Status: SHIPPED | OUTPATIENT
Start: 2022-09-19

## 2022-09-19 NOTE — TELEPHONE ENCOUNTER
CALLED PATIENT LFT MSG ON VM TO INFORM HER THAT HER AMBIEN IS HERE AT THE OFFICE FOR HER TO  AND TAKE TO PHARMACY.

## 2022-10-17 ENCOUNTER — OFFICE VISIT (OUTPATIENT)
Dept: FAMILY MEDICINE CLINIC | Facility: CLINIC | Age: 62
End: 2022-10-17

## 2022-10-17 VITALS
SYSTOLIC BLOOD PRESSURE: 132 MMHG | HEIGHT: 61 IN | WEIGHT: 177.4 LBS | HEART RATE: 98 BPM | TEMPERATURE: 96.7 F | BODY MASS INDEX: 33.49 KG/M2 | DIASTOLIC BLOOD PRESSURE: 84 MMHG | OXYGEN SATURATION: 99 %

## 2022-10-17 DIAGNOSIS — E03.9 ACQUIRED HYPOTHYROIDISM: ICD-10-CM

## 2022-10-17 DIAGNOSIS — Z76.0 MEDICATION REFILL: ICD-10-CM

## 2022-10-17 DIAGNOSIS — E53.8 VITAMIN B12 DEFICIENCY: ICD-10-CM

## 2022-10-17 DIAGNOSIS — Z09 FOLLOW-UP EXAM: Primary | ICD-10-CM

## 2022-10-17 DIAGNOSIS — Z98.890 HISTORY OF BILATERAL BREAST REDUCTION SURGERY: ICD-10-CM

## 2022-10-17 LAB
ALBUMIN SERPL-MCNC: 4.2 G/DL (ref 3.5–5.2)
ALBUMIN/GLOB SERPL: 1.4 G/DL
ALP SERPL-CCNC: 72 U/L (ref 39–117)
ALT SERPL W P-5'-P-CCNC: 16 U/L (ref 1–33)
ANION GAP SERPL CALCULATED.3IONS-SCNC: 11 MMOL/L (ref 5–15)
AST SERPL-CCNC: 23 U/L (ref 1–32)
BILIRUB SERPL-MCNC: 0.2 MG/DL (ref 0–1.2)
BUN SERPL-MCNC: 15 MG/DL (ref 8–23)
BUN/CREAT SERPL: 19 (ref 7–25)
CALCIUM SPEC-SCNC: 9.3 MG/DL (ref 8.6–10.5)
CHLORIDE SERPL-SCNC: 101 MMOL/L (ref 98–107)
CO2 SERPL-SCNC: 25 MMOL/L (ref 22–29)
CREAT SERPL-MCNC: 0.79 MG/DL (ref 0.57–1)
DEPRECATED RDW RBC AUTO: 41.3 FL (ref 37–54)
EGFRCR SERPLBLD CKD-EPI 2021: 85.8 ML/MIN/1.73
ERYTHROCYTE [DISTWIDTH] IN BLOOD BY AUTOMATED COUNT: 12.9 % (ref 12.3–15.4)
GLOBULIN UR ELPH-MCNC: 3 GM/DL
GLUCOSE SERPL-MCNC: 99 MG/DL (ref 65–99)
HCT VFR BLD AUTO: 34.9 % (ref 34–46.6)
HGB BLD-MCNC: 11.4 G/DL (ref 12–15.9)
MCH RBC QN AUTO: 28.9 PG (ref 26.6–33)
MCHC RBC AUTO-ENTMCNC: 32.7 G/DL (ref 31.5–35.7)
MCV RBC AUTO: 88.4 FL (ref 79–97)
PLATELET # BLD AUTO: 282 10*3/MM3 (ref 140–450)
PMV BLD AUTO: 12.1 FL (ref 6–12)
POTASSIUM SERPL-SCNC: 4.3 MMOL/L (ref 3.5–5.2)
PROT SERPL-MCNC: 7.2 G/DL (ref 6–8.5)
RBC # BLD AUTO: 3.95 10*6/MM3 (ref 3.77–5.28)
SODIUM SERPL-SCNC: 137 MMOL/L (ref 136–145)
WBC NRBC COR # BLD: 5.45 10*3/MM3 (ref 3.4–10.8)

## 2022-10-17 PROCEDURE — 99214 OFFICE O/P EST MOD 30 MIN: CPT | Performed by: NURSE PRACTITIONER

## 2022-10-17 PROCEDURE — 84436 ASSAY OF TOTAL THYROXINE: CPT | Performed by: NURSE PRACTITIONER

## 2022-10-17 PROCEDURE — 85027 COMPLETE CBC AUTOMATED: CPT | Performed by: NURSE PRACTITIONER

## 2022-10-17 PROCEDURE — 84443 ASSAY THYROID STIM HORMONE: CPT | Performed by: NURSE PRACTITIONER

## 2022-10-17 PROCEDURE — 85007 BL SMEAR W/DIFF WBC COUNT: CPT | Performed by: NURSE PRACTITIONER

## 2022-10-17 PROCEDURE — 80053 COMPREHEN METABOLIC PANEL: CPT | Performed by: NURSE PRACTITIONER

## 2022-10-17 PROCEDURE — 36415 COLL VENOUS BLD VENIPUNCTURE: CPT | Performed by: NURSE PRACTITIONER

## 2022-10-17 PROCEDURE — 84479 ASSAY OF THYROID (T3 OR T4): CPT | Performed by: NURSE PRACTITIONER

## 2022-10-17 RX ORDER — CYANOCOBALAMIN 1000 UG/ML
1000 INJECTION, SOLUTION INTRAMUSCULAR; SUBCUTANEOUS
Qty: 3 ML | Refills: 0 | Status: SHIPPED | OUTPATIENT
Start: 2022-10-17

## 2022-10-17 RX ORDER — PANTOPRAZOLE SODIUM 40 MG/1
40 TABLET, DELAYED RELEASE ORAL DAILY
Qty: 90 TABLET | Refills: 1 | Status: SHIPPED | OUTPATIENT
Start: 2022-10-17

## 2022-10-17 RX ORDER — ATORVASTATIN CALCIUM 10 MG/1
10 TABLET, FILM COATED ORAL DAILY
Qty: 90 TABLET | Refills: 1 | Status: SHIPPED | OUTPATIENT
Start: 2022-10-17

## 2022-10-17 RX ORDER — LEVOTHYROXINE SODIUM 0.2 MG/1
200 TABLET ORAL DAILY
Qty: 90 TABLET | Refills: 1 | Status: SHIPPED | OUTPATIENT
Start: 2022-10-17 | End: 2022-10-19 | Stop reason: SDUPTHER

## 2022-10-17 RX ORDER — METHOCARBAMOL 500 MG/1
500 TABLET, FILM COATED ORAL 3 TIMES DAILY
Qty: 90 TABLET | Refills: 3 | Status: SHIPPED | OUTPATIENT
Start: 2022-10-17

## 2022-10-17 RX ORDER — AMITRIPTYLINE HYDROCHLORIDE 100 MG/1
100 TABLET, FILM COATED ORAL NIGHTLY
Qty: 90 TABLET | Refills: 1 | Status: SHIPPED | OUTPATIENT
Start: 2022-10-17

## 2022-10-17 RX ORDER — GABAPENTIN 300 MG/1
300 CAPSULE ORAL DAILY
Qty: 90 CAPSULE | Refills: 0 | Status: SHIPPED | OUTPATIENT
Start: 2022-10-17

## 2022-10-17 NOTE — PROGRESS NOTES
Chief Complaint  Follow-up and Hypothyroidism    Subjective          Medical History: has a past medical history of Broken bones (), Condition not found, Depression, Gallstone (), Hemorrhoid, Hernia cerebri (HCC) (), Hyperlipidemia, Night sweats, and Sinus trouble.     Surgical History: has a past surgical history that includes  section (1983); Gastric bypass (); Hysterectomy (); Other surgical history (); Bariatric Surgery (); and Cholecystectomy ().     Family History: family history includes Diabetes in her daughter, mother, and another family member; Heart attack in some other family members; Heart disease in her father; Stroke in her father, mother, and another family member.     Social History: reports that she quit smoking about 22 years ago. Her smoking use included cigarettes. She started smoking about 40 years ago. She has a 23.00 pack-year smoking history. She has never used smokeless tobacco. She reports that she does not drink alcohol and does not use drugs.    Wade Loo presents to [facility]  History of Present Illness  Hypothyroidism  This is a chronic problem. The current episode started more than 1 year ago. The problem occurs rarely. Associated symptoms include arthralgias, joint swelling and myalgias and fatigue. Patient states that she is feeling more sluggish and would like for her thyroid levels to be checked. Pertinent negatives include no abdominal pain or sore throat. Nothing aggravates the symptoms. Treatments tried: levothyroxin. The treatment provided significant relief.     Patient had her breast reduction surgery done  per plastic surgery in [place].  She states that she has been cleared to go back to work.  She states she is feeling well, states that she lost about 1-1/2 pounds per breast.  I was able to look at the incisions incisions look very good, they are well aligned without any  "signs or symptoms of infection.    Vitamin B12 deficiency  Chronic condition, patient has had the condition for over a year.  Condition is stable, patient is currently taking vitamin B12 injections once a month.  Associated symptoms initially were fatigue, dry skin, and myalgia.  Patient is doing well on vitamin supplements and is requesting refill today.      Objective   Vital Signs:   /84   Pulse 98   Temp 96.7 °F (35.9 °C)   Ht 154.9 cm (61\")   Wt 80.5 kg (177 lb 6.4 oz)   SpO2 99%   BMI 33.52 kg/m²     Physical Exam  Vitals reviewed.   Constitutional:       Appearance: Normal appearance. She is well-developed.   HENT:      Head: Normocephalic and atraumatic.   Eyes:      Conjunctiva/sclera: Conjunctivae normal.      Pupils: Pupils are equal, round, and reactive to light.   Cardiovascular:      Rate and Rhythm: Normal rate and regular rhythm.      Heart sounds: No murmur heard.    No friction rub.   Pulmonary:      Effort: Pulmonary effort is normal.      Breath sounds: Normal breath sounds. No wheezing or rhonchi.   Chest:          Comments: Incisional sites well aligned, healing as expected without signs and symptoms of infection  Skin:     General: Skin is warm and dry.   Neurological:      Mental Status: She is alert and oriented to person, place, and time.   Psychiatric:         Mood and Affect: Mood and affect normal.         Behavior: Behavior normal.         Thought Content: Thought content normal.         Judgment: Judgment normal.        Result Review :   The following data was reviewed by: ARLEEN Tierney on 10/17/2022:  Common labs    Common Labs 2/25/22 2/25/22 2/25/22 8/11/22 8/11/22 8/11/22    1442 1442 1442 0928 0928 0928   Glucose  83    83   BUN  24 (A)    22   Creatinine  0.93    0.81   eGFR Non African Am  61       Sodium  139    140   Potassium  4.1    4.2   Chloride  102    105   Calcium  9.2    9.1   Albumin  4.40    4.10   Total Bilirubin  0.2    <0.2   Alkaline " "Phosphatase  65    56   AST (SGOT)  18    23   ALT (SGPT)  14    26   WBC 4.52   3.81     Hemoglobin 12.3   12.0     Hematocrit 36.1   35.3     Platelets 260   217     Total Cholesterol   173  157    Triglycerides   100  107    HDL Cholesterol   83 (A)  79 (A)    LDL Cholesterol    72  59    (A) Abnormal value            Data reviewed: Surgical notes from plastic surgery            Current Outpatient Medications on File Prior to Visit   Medication Sig Dispense Refill   • Calcium Carbonate-Vitamin D (calcium-vitamin D) 500-200 MG-UNIT tablet per tablet Take 1 tablet by mouth Daily.     • celecoxib (CeleBREX) 400 MG capsule Take 1 capsule by mouth Daily. 90 capsule 1   • Diclofenac Sodium (VOLTAREN) 1 % gel gel Apply 4 g topically to the appropriate area as directed 4 (Four) Times a Day As Needed.     • Syringe 25G X 1-1/2\" 3 ML misc 1 each Every 30 (Thirty) Days. 100 each 0   • vitamin B-6 (PYRIDOXINE) 50 MG tablet Take 50 mg by mouth Daily.     • zolpidem (AMBIEN) 10 MG tablet Take 1 tablet by mouth At Night As Needed for Sleep. 90 tablet 1   • [DISCONTINUED] amitriptyline (ELAVIL) 100 MG tablet Take 1 tablet by mouth Every Night. 90 tablet 1   • [DISCONTINUED] atorvastatin (LIPITOR) 10 MG tablet Take 1 tablet by mouth Daily. 90 tablet 1   • [DISCONTINUED] cyanocobalamin 1000 MCG/ML injection Inject 1 mL into the appropriate muscle as directed by prescriber Every 30 (Thirty) Days. 3 mL 0   • [DISCONTINUED] estrogens, conjugated, (PREMARIN) 0.625 MG tablet Take 1 tablet by mouth Daily. Take daily for 21 days then do not take for 7 days. 90 tablet 1   • [DISCONTINUED] gabapentin (NEURONTIN) 300 MG capsule Take 1 capsule by mouth Daily. 90 capsule 0   • [DISCONTINUED] levothyroxine (SYNTHROID, LEVOTHROID) 200 MCG tablet Take 1 tablet by mouth Daily. 90 tablet 1   • [DISCONTINUED] methocarbamol (ROBAXIN) 500 MG tablet Take 1 tablet by mouth 3 (Three) Times a Day. 90 tablet 3   • [DISCONTINUED] pantoprazole (PROTONIX) 40 " MG EC tablet Take 1 tablet by mouth Daily. 90 tablet 1     No current facility-administered medications on file prior to visit.        Assessment and Plan    Diagnoses and all orders for this visit:    1. Follow-up exam (Primary)    2. Vitamin B12 deficiency  Comments:  Patient needing refills of vitamin B12  Orders:  -     cyanocobalamin 1000 MCG/ML injection; Inject 1 mL into the appropriate muscle as directed by prescriber Every 30 (Thirty) Days.  Dispense: 3 mL; Refill: 0    3. Acquired hypothyroidism  Comments:  We will recheck labs, adjust medication if needed  Orders:  -     Thyroid Panel With TSH  -     CBC With Manual Differential  -     Comprehensive Metabolic Panel    4. History of bilateral breast reduction surgery  Comments:  Form was signed for patient to return back to work, she is feeling well with no issues or concerns.    5. Medication refill    Other orders  -     methocarbamol (ROBAXIN) 500 MG tablet; Take 1 tablet by mouth 3 (Three) Times a Day.  Dispense: 90 tablet; Refill: 3  -     amitriptyline (ELAVIL) 100 MG tablet; Take 1 tablet by mouth Every Night.  Dispense: 90 tablet; Refill: 1  -     gabapentin (NEURONTIN) 300 MG capsule; Take 1 capsule by mouth Daily.  Dispense: 90 capsule; Refill: 0  -     estrogens, conjugated, (PREMARIN) 0.625 MG tablet; Take 1 tablet by mouth Daily. Take daily for 21 days then do not take for 7 days.  Dispense: 90 tablet; Refill: 1  -     atorvastatin (LIPITOR) 10 MG tablet; Take 1 tablet by mouth Daily.  Dispense: 90 tablet; Refill: 1  -     levothyroxine (SYNTHROID, LEVOTHROID) 200 MCG tablet; Take 1 tablet by mouth Daily.  Dispense: 90 tablet; Refill: 1  -     pantoprazole (PROTONIX) 40 MG EC tablet; Take 1 tablet by mouth Daily.  Dispense: 90 tablet; Refill: 1        Follow Up   No follow-ups on file.  Patient was given instructions and counseling regarding her condition or for health maintenance advice. Please see specific information pulled into the AVS if  appropriate.

## 2022-10-18 LAB
BASOPHILS # BLD MANUAL: 0.11 10*3/MM3 (ref 0–0.2)
BASOPHILS NFR BLD MANUAL: 2 % (ref 0–1.5)
EOSINOPHIL # BLD MANUAL: 0.22 10*3/MM3 (ref 0–0.4)
EOSINOPHIL NFR BLD MANUAL: 4 % (ref 0.3–6.2)
LYMPHOCYTES # BLD MANUAL: 1.54 10*3/MM3 (ref 0.7–3.1)
LYMPHOCYTES NFR BLD MANUAL: 5.1 % (ref 5–12)
MONOCYTES # BLD: 0.28 10*3/MM3 (ref 0.1–0.9)
NEUTROPHILS # BLD AUTO: 3.3 10*3/MM3 (ref 1.7–7)
NEUTROPHILS NFR BLD MANUAL: 60.6 % (ref 42.7–76)
PLAT MORPH BLD: NORMAL
POIKILOCYTOSIS BLD QL SMEAR: ABNORMAL
T-UPTAKE NFR SERPL: 0.75 TBI (ref 0.8–1.3)
T4 SERPL-MCNC: 15 MCG/DL (ref 4.5–11.7)
TSH SERPL DL<=0.05 MIU/L-ACNC: 0.02 UIU/ML (ref 0.27–4.2)
VARIANT LYMPHS NFR BLD MANUAL: 28.3 % (ref 19.6–45.3)
WBC MORPH BLD: NORMAL

## 2022-10-19 DIAGNOSIS — E03.9 ACQUIRED HYPOTHYROIDISM: Primary | ICD-10-CM

## 2022-10-19 RX ORDER — LEVOTHYROXINE SODIUM 175 UG/1
175 TABLET ORAL DAILY
Qty: 90 TABLET | Refills: 0 | Status: SHIPPED | OUTPATIENT
Start: 2022-10-19

## 2022-12-20 NOTE — TELEPHONE ENCOUNTER
JEROME in case father calls--pt states he may have questions.  We discussed with Melita, Dr. Dobbins will not refill out of state anymore, nor will my CP.  Thank you Pt informed.

## 2023-01-23 ENCOUNTER — TELEPHONE (OUTPATIENT)
Dept: FAMILY MEDICINE CLINIC | Facility: CLINIC | Age: 63
End: 2023-01-23

## 2023-01-23 NOTE — TELEPHONE ENCOUNTER
Contacted patient's pharmacy on file to verify Rx(s) status.    Matter addressed via [employer] communication.

## 2023-01-23 NOTE — TELEPHONE ENCOUNTER
Pt is needing refills on these medications     amitriptyline (ELAVIL) 100 MG tablet    estrogens, conjugated, (PREMARIN) 0.625 MG tablet    Please called pt when these are able to be picked up

## 2023-02-01 ENCOUNTER — LAB (OUTPATIENT)
Dept: FAMILY MEDICINE CLINIC | Facility: CLINIC | Age: 63
End: 2023-02-01
Payer: OTHER GOVERNMENT

## 2023-02-01 DIAGNOSIS — E03.9 ACQUIRED HYPOTHYROIDISM: ICD-10-CM

## 2023-02-01 LAB
T-UPTAKE NFR SERPL: 0.95 TBI (ref 0.8–1.3)
T4 SERPL-MCNC: 10.9 MCG/DL (ref 4.5–11.7)
TSH SERPL DL<=0.05 MIU/L-ACNC: 0.4 UIU/ML (ref 0.27–4.2)

## 2023-02-01 PROCEDURE — 84443 ASSAY THYROID STIM HORMONE: CPT | Performed by: NURSE PRACTITIONER

## 2023-02-01 PROCEDURE — 84436 ASSAY OF TOTAL THYROXINE: CPT | Performed by: NURSE PRACTITIONER

## 2023-02-01 PROCEDURE — 84479 ASSAY OF THYROID (T3 OR T4): CPT | Performed by: NURSE PRACTITIONER

## 2023-02-01 PROCEDURE — 36415 COLL VENOUS BLD VENIPUNCTURE: CPT | Performed by: NURSE PRACTITIONER

## 2023-04-16 DIAGNOSIS — E53.8 VITAMIN B12 DEFICIENCY: ICD-10-CM

## 2023-04-17 RX ORDER — ATORVASTATIN CALCIUM 10 MG/1
10 TABLET, FILM COATED ORAL DAILY
Qty: 90 TABLET | Refills: 1 | Status: SHIPPED | OUTPATIENT
Start: 2023-04-17

## 2023-04-17 RX ORDER — CELECOXIB 400 MG/1
400 CAPSULE ORAL DAILY
Qty: 90 CAPSULE | Refills: 1 | Status: SHIPPED | OUTPATIENT
Start: 2023-04-17

## 2023-04-17 RX ORDER — METHOCARBAMOL 500 MG/1
500 TABLET, FILM COATED ORAL 3 TIMES DAILY
Qty: 90 TABLET | Refills: 3 | Status: SHIPPED | OUTPATIENT
Start: 2023-04-17

## 2023-04-17 RX ORDER — CYANOCOBALAMIN 1000 UG/ML
1000 INJECTION, SOLUTION INTRAMUSCULAR; SUBCUTANEOUS
Qty: 3 ML | Refills: 0 | Status: SHIPPED | OUTPATIENT
Start: 2023-04-17

## 2023-04-17 RX ORDER — PANTOPRAZOLE SODIUM 40 MG/1
40 TABLET, DELAYED RELEASE ORAL DAILY
Qty: 90 TABLET | Refills: 1 | Status: SHIPPED | OUTPATIENT
Start: 2023-04-17

## 2023-04-17 RX ORDER — ZOLPIDEM TARTRATE 10 MG/1
10 TABLET ORAL NIGHTLY PRN
Qty: 90 TABLET | Refills: 1 | Status: SHIPPED | OUTPATIENT
Start: 2023-04-17

## 2023-04-17 RX ORDER — LEVOTHYROXINE SODIUM 175 UG/1
175 TABLET ORAL DAILY
Qty: 90 TABLET | Refills: 0 | Status: SHIPPED | OUTPATIENT
Start: 2023-04-17

## 2023-04-17 RX ORDER — GABAPENTIN 300 MG/1
300 CAPSULE ORAL DAILY
Qty: 90 CAPSULE | Refills: 0 | Status: SHIPPED | OUTPATIENT
Start: 2023-04-17

## 2023-04-17 RX ORDER — LANOLIN ALCOHOL/MO/W.PET/CERES
50 CREAM (GRAM) TOPICAL DAILY
Qty: 90 TABLET | Refills: 1 | Status: SHIPPED | OUTPATIENT
Start: 2023-04-17

## 2023-04-17 RX ORDER — AMITRIPTYLINE HYDROCHLORIDE 100 MG/1
100 TABLET, FILM COATED ORAL NIGHTLY
Qty: 90 TABLET | Refills: 1 | Status: SHIPPED | OUTPATIENT
Start: 2023-04-17

## 2023-04-25 DIAGNOSIS — E53.8 VITAMIN B12 DEFICIENCY: ICD-10-CM

## 2023-04-25 NOTE — TELEPHONE ENCOUNTER
Pt is needing refills on all of her medications. Pt is asking for a call when these have been put in and able to be picked up.

## 2023-04-25 NOTE — TELEPHONE ENCOUNTER
All medications have been pended for refill two will print out as paper scripts as they cannot be sent to [place] electronically.

## 2023-04-29 RX ORDER — AMITRIPTYLINE HYDROCHLORIDE 100 MG/1
100 TABLET, FILM COATED ORAL NIGHTLY
Qty: 90 TABLET | Refills: 1 | Status: SHIPPED | OUTPATIENT
Start: 2023-04-29

## 2023-04-29 RX ORDER — CYANOCOBALAMIN 1000 UG/ML
1000 INJECTION, SOLUTION INTRAMUSCULAR; SUBCUTANEOUS
Qty: 3 ML | Refills: 0 | Status: SHIPPED | OUTPATIENT
Start: 2023-04-29

## 2023-04-29 RX ORDER — PANTOPRAZOLE SODIUM 40 MG/1
40 TABLET, DELAYED RELEASE ORAL DAILY
Qty: 90 TABLET | Refills: 1 | Status: SHIPPED | OUTPATIENT
Start: 2023-04-29

## 2023-04-29 RX ORDER — METHOCARBAMOL 500 MG/1
500 TABLET, FILM COATED ORAL 3 TIMES DAILY
Qty: 90 TABLET | Refills: 3 | Status: SHIPPED | OUTPATIENT
Start: 2023-04-29

## 2023-04-29 RX ORDER — CELECOXIB 400 MG/1
400 CAPSULE ORAL DAILY
Qty: 90 CAPSULE | Refills: 1 | Status: SHIPPED | OUTPATIENT
Start: 2023-04-29

## 2023-04-29 RX ORDER — LEVOTHYROXINE SODIUM 175 UG/1
175 TABLET ORAL DAILY
Qty: 90 TABLET | Refills: 0 | Status: SHIPPED | OUTPATIENT
Start: 2023-04-29

## 2023-04-29 RX ORDER — ZOLPIDEM TARTRATE 10 MG/1
10 TABLET ORAL NIGHTLY PRN
Qty: 90 TABLET | Refills: 1 | Status: SHIPPED | OUTPATIENT
Start: 2023-04-29

## 2023-04-29 RX ORDER — ATORVASTATIN CALCIUM 10 MG/1
10 TABLET, FILM COATED ORAL DAILY
Qty: 90 TABLET | Refills: 1 | Status: SHIPPED | OUTPATIENT
Start: 2023-04-29

## 2023-04-29 RX ORDER — GABAPENTIN 300 MG/1
300 CAPSULE ORAL DAILY
Qty: 90 CAPSULE | Refills: 0 | Status: SHIPPED | OUTPATIENT
Start: 2023-04-29

## 2023-04-29 RX ORDER — LANOLIN ALCOHOL/MO/W.PET/CERES
50 CREAM (GRAM) TOPICAL DAILY
Qty: 90 TABLET | Refills: 1 | Status: SHIPPED | OUTPATIENT
Start: 2023-04-29

## 2023-07-28 ENCOUNTER — OFFICE VISIT (OUTPATIENT)
Dept: FAMILY MEDICINE CLINIC | Facility: CLINIC | Age: 63
End: 2023-07-28
Payer: OTHER GOVERNMENT

## 2023-07-28 VITALS
SYSTOLIC BLOOD PRESSURE: 144 MMHG | DIASTOLIC BLOOD PRESSURE: 82 MMHG | HEIGHT: 61 IN | BODY MASS INDEX: 32.06 KG/M2 | HEART RATE: 94 BPM | WEIGHT: 169.8 LBS | OXYGEN SATURATION: 97 % | TEMPERATURE: 97.8 F

## 2023-07-28 DIAGNOSIS — E55.9 VITAMIN D DEFICIENCY: ICD-10-CM

## 2023-07-28 DIAGNOSIS — Z09 FOLLOW-UP EXAM, 7 MONTHS TO 1 YEAR SINCE PREVIOUS EXAM: Primary | ICD-10-CM

## 2023-07-28 DIAGNOSIS — E66.09 CLASS 1 OBESITY DUE TO EXCESS CALORIES WITHOUT SERIOUS COMORBIDITY WITH BODY MASS INDEX (BMI) OF 32.0 TO 32.9 IN ADULT: ICD-10-CM

## 2023-07-28 DIAGNOSIS — R73.01 IFG (IMPAIRED FASTING GLUCOSE): ICD-10-CM

## 2023-07-28 DIAGNOSIS — Z01.83 ENCOUNTER FOR BLOOD TYPING: ICD-10-CM

## 2023-07-28 DIAGNOSIS — E03.9 ACQUIRED HYPOTHYROIDISM: ICD-10-CM

## 2023-07-28 DIAGNOSIS — E53.8 VITAMIN B12 DEFICIENCY: ICD-10-CM

## 2023-07-28 DIAGNOSIS — Z12.31 ENCOUNTER FOR SCREENING MAMMOGRAM FOR MALIGNANT NEOPLASM OF BREAST: ICD-10-CM

## 2023-07-28 DIAGNOSIS — Z13.220 LIPID SCREENING: ICD-10-CM

## 2023-07-28 RX ORDER — PHENTERMINE HYDROCHLORIDE 15 MG/1
15 CAPSULE ORAL EVERY MORNING
Qty: 30 CAPSULE | Refills: 0 | Status: SHIPPED | OUTPATIENT
Start: 2023-07-28

## 2023-07-28 RX ORDER — TOPIRAMATE 25 MG/1
25 TABLET ORAL
Qty: 30 TABLET | Refills: 1 | Status: SHIPPED | OUTPATIENT
Start: 2023-07-28 | End: 2023-07-31

## 2023-07-28 NOTE — PROGRESS NOTES
Chief Complaint  Weight Loss    Subjective        Medical History: has a past medical history of Broken bones (), Condition not found, Depression, Gallstone (), Hemorrhoid, Hernia cerebri (), Hyperlipidemia, Night sweats, and Sinus trouble.     Surgical History: has a past surgical history that includes  section ( 1998 ); Gastric bypass (); Hysterectomy (); Other surgical history (); Bariatric Surgery (); and Cholecystectomy ().     Family History: family history includes Diabetes in her daughter, mother, and another family member; Heart attack in some other family members; Heart disease in her father; Stroke in her father, mother, and another family member.     Social History: reports that she quit smoking about 23 years ago. Her smoking use included cigarettes. She started smoking about 41 years ago. She has a 23.00 pack-year smoking history. She has never used smokeless tobacco. She reports that she does not drink alcohol and does not use drugs.    Wade Loo presents to [facility]  History of Present Illness  Hypothyroidism  This is a chronic problem. The current episode started more than 1 year ago. The problem occurs rarely. Associated symptoms include arthralgias, joint swelling and myalgias and fatigue. Patient states that she is feeling more sluggish and would like for her thyroid levels to be checked. Pertinent negatives include no abdominal pain or sore throat. Nothing aggravates the symptoms. Treatments tried: levothyroxin. The treatment provided significant relief.   Vitamin B12 deficiency  Chronic condition, patient has had the condition for over a year.  Condition is stable, patient is currently taking vitamin B12 injections once a month.  Associated symptoms initially were fatigue, dry skin, and myalgia.  Patient is doing well on vitamin supplements and is requesting refill today.  Vitamin D deficiency  This is a  "chronic problem.  Current episode has been for longer than 6 months.  Problem has gradually been improving since he has been on vitamin D supplements.  He denies any excessive fatigue, hair loss, skin changes due to the vitamin D deficiency.  He tries to eat a well-balanced diet.  He has been on the vitamin D weekly, no compliance problems.  Condition is stable.    Patient would like to get her blood type known, will send order for lab for patient to go to the hospital and get blood typing done.        Objective   Vital Signs:   /82 (BP Location: Left arm, Patient Position: Sitting, Cuff Size: Adult)   Pulse 94   Temp 97.8 °F (36.6 °C)   Ht 154.9 cm (61\")   Wt 77 kg (169 lb 12.8 oz)   SpO2 97%   BMI 32.08 kg/m²       Wt Readings from Last 3 Encounters:   07/28/23 77 kg (169 lb 12.8 oz)   10/17/22 80.5 kg (177 lb 6.4 oz)   08/11/22 85.4 kg (188 lb 3.2 oz)        BP Readings from Last 3 Encounters:   07/28/23 144/82   10/17/22 132/84   08/11/22 124/72        BMI is >= 30 and <35. (Class 1 Obesity). The following options were offered after discussion;: weight loss educational material (shared in after visit summary)       Physical Exam  Vitals reviewed.   Constitutional:       Appearance: Normal appearance. She is well-developed. She is obese.   HENT:      Head: Normocephalic and atraumatic.      Right Ear: External ear normal.      Left Ear: External ear normal.   Eyes:      Conjunctiva/sclera: Conjunctivae normal.      Pupils: Pupils are equal, round, and reactive to light.   Cardiovascular:      Rate and Rhythm: Normal rate and regular rhythm.      Heart sounds: No murmur heard.  Pulmonary:      Effort: Pulmonary effort is normal.      Breath sounds: Normal breath sounds. No wheezing or rhonchi.   Musculoskeletal:      Right lower leg: No edema.      Left lower leg: No edema.   Skin:     General: Skin is warm and dry.   Neurological:      Mental Status: She is alert and oriented to person, place, and " time.   Psychiatric:         Mood and Affect: Mood and affect normal.         Behavior: Behavior normal.         Thought Content: Thought content normal.         Judgment: Judgment normal.      Result Review :  {The following data was reviewed by ARLEEN Tierney on 07/28/2023.  Common labs          8/11/2022    09:28 10/17/2022    15:59   Common Labs   Glucose 83  99    BUN 22  15    Creatinine 0.81  0.79    Sodium 140  137    Potassium 4.2  4.3    Chloride 105  101    Calcium 9.1  9.3    Albumin 4.10  4.20    Total Bilirubin <0.2  0.2    Alkaline Phosphatase 56  72    AST (SGOT) 23  23    ALT (SGPT) 26  16    WBC 3.81  5.45    Hemoglobin 12.0  11.4    Hematocrit 35.3  34.9    Platelets 217  282    Total Cholesterol 157     Triglycerides 107     HDL Cholesterol 79     LDL Cholesterol  59       Data reviewed : previous office note             Current Outpatient Medications on File Prior to Visit   Medication Sig Dispense Refill    amitriptyline (ELAVIL) 100 MG tablet Take 1 tablet by mouth Every Night. 90 tablet 1    atorvastatin (LIPITOR) 10 MG tablet Take 1 tablet by mouth Daily. 90 tablet 1    Calcium Carbonate-Vitamin D (calcium-vitamin D) 500-200 MG-UNIT tablet per tablet Take 1 tablet by mouth Daily.      celecoxib (CeleBREX) 400 MG capsule Take 1 capsule by mouth Daily. 90 capsule 1    cyanocobalamin 1000 MCG/ML injection Inject 1 mL into the appropriate muscle as directed by prescriber Every 30 (Thirty) Days. 3 mL 0    Diclofenac Sodium (VOLTAREN) 1 % gel gel Apply 4 g topically to the appropriate area as directed 4 (Four) Times a Day As Needed (as needed for pain and swelling.). 100 g 2    estrogens, conjugated, (PREMARIN) 0.625 MG tablet Take 1 tablet by mouth Daily. Take daily for 21 days then do not take for 7 days. 90 tablet 1    gabapentin (NEURONTIN) 300 MG capsule Take 1 capsule by mouth Daily. 90 capsule 0    levothyroxine (SYNTHROID, LEVOTHROID) 175 MCG tablet Take 1 tablet by mouth  "Daily. 90 tablet 0    methocarbamol (ROBAXIN) 500 MG tablet Take 1 tablet by mouth 3 (Three) Times a Day. 90 tablet 3    pantoprazole (PROTONIX) 40 MG EC tablet Take 1 tablet by mouth Daily. 90 tablet 1    Syringe 25G X 1-1/2\" 3 ML misc 1 each Every 30 (Thirty) Days. 100 each 0    vitamin B-6 (PYRIDOXINE) 50 MG tablet Take 1 tablet by mouth Daily. 90 tablet 1    zolpidem (AMBIEN) 10 MG tablet Take 1 tablet by mouth At Night As Needed for Sleep. 90 tablet 1     No current facility-administered medications on file prior to visit.        Assessment and Plan  Diagnoses and all orders for this visit:    1. Follow-up exam, 7 months to 1 year since previous exam (Primary)    2. Vitamin D deficiency  Comments:  Check labs, adjust medication if needed  Orders:  -     Vitamin D,25-Hydroxy; Future    3. Vitamin B12 deficiency  Comments:  We will check labs, adjust medication if needed  Orders:  -     Vitamin B12 & Folate; Future    4. Acquired hypothyroidism  Comments:  We will check labs, adjust medication if needed  Orders:  -     CBC Auto Differential; Future  -     Comprehensive Metabolic Panel; Future  -     Thyroid Panel With TSH; Future    5. Lipid screening  -     Lipid Panel; Future    6. Encounter for screening mammogram for malignant neoplasm of breast  -     Mammo Screening Digital Tomosynthesis Bilateral With CAD; Future    7. IFG (impaired fasting glucose)  Comments:  We will check labs to rule out diabetes.  Orders:  -     CBC Auto Differential; Future  -     Comprehensive Metabolic Panel; Future  -     Hemoglobin A1c; Future    8. Encounter for blood typing  -     ABO/Rh; Future    9. Class 1 obesity due to excess calories without serious comorbidity with body mass index (BMI) of 32.0 to 32.9 in adult  Comments:  We will start on 15 mg of phentermine with 25 mg of Toprol mate have patient follow-up in 1 month to see how she is doing.  Orders:  -     phentermine 15 MG capsule; Take 1 capsule by mouth Every " Morning.  Dispense: 30 capsule; Refill: 0    Other orders  -     topiramate (TOPAMAX) 25 MG tablet; Take 1 tablet by mouth Every Morning.  Dispense: 30 tablet; Refill: 1        Follow Up   Return in about 1 month (around 8/28/2023) for Recheck, weight recheck.  Patient was given instructions and counseling regarding her condition or for health maintenance advice. Please see specific information pulled into the AVS if appropriate.       Part of this note may be electronic transcription/translation of spoken language to printed text using the Dragon dictation system

## 2023-07-31 RX ORDER — TOPIRAMATE 25 MG/1
25 TABLET ORAL
Qty: 90 TABLET | Refills: 0 | Status: SHIPPED | OUTPATIENT
Start: 2023-07-31

## 2023-08-02 ENCOUNTER — PRIOR AUTHORIZATION (OUTPATIENT)
Dept: FAMILY MEDICINE CLINIC | Facility: CLINIC | Age: 63
End: 2023-08-02
Payer: OTHER GOVERNMENT

## 2023-08-17 RX ORDER — AMITRIPTYLINE HYDROCHLORIDE 100 MG/1
100 TABLET ORAL NIGHTLY
Qty: 90 TABLET | Refills: 1 | Status: SHIPPED | OUTPATIENT
Start: 2023-08-17

## 2023-08-17 NOTE — TELEPHONE ENCOUNTER
Patient is requesting refill on prescription amitriptyline (ELAVIL) 100 MG tablet. Please call and advise  [phone]

## 2023-08-17 NOTE — TELEPHONE ENCOUNTER
Requested Prescriptions     Pending Prescriptions Disp Refills    amitriptyline (ELAVIL) 100 MG tablet 90 tablet 1     Sig: Take 1 tablet by mouth Every Night.     Last OV: Office Visit with Mariel Craig APRN (07/28/2023)     Next Appt: No future appt on file    Last Labs: 10/17/2022    Pharmacy: [address] [phone] [place] [phone]  [phone]

## 2023-08-24 ENCOUNTER — LAB (OUTPATIENT)
Dept: LAB | Facility: HOSPITAL | Age: 63
End: 2023-08-24
Payer: OTHER GOVERNMENT

## 2023-08-24 DIAGNOSIS — Z13.220 LIPID SCREENING: ICD-10-CM

## 2023-08-24 DIAGNOSIS — Z01.83 ENCOUNTER FOR BLOOD TYPING: ICD-10-CM

## 2023-08-24 DIAGNOSIS — E03.9 ACQUIRED HYPOTHYROIDISM: ICD-10-CM

## 2023-08-24 DIAGNOSIS — E53.8 VITAMIN B12 DEFICIENCY: ICD-10-CM

## 2023-08-24 DIAGNOSIS — R73.01 IFG (IMPAIRED FASTING GLUCOSE): ICD-10-CM

## 2023-08-24 DIAGNOSIS — E55.9 VITAMIN D DEFICIENCY: ICD-10-CM

## 2023-08-24 LAB
25(OH)D3 SERPL-MCNC: 32 NG/ML (ref 30–100)
ABO GROUP BLD: NORMAL
ALBUMIN SERPL-MCNC: 4 G/DL (ref 3.5–5.2)
ALBUMIN/GLOB SERPL: 1.4 G/DL
ALP SERPL-CCNC: 56 U/L (ref 39–117)
ALT SERPL W P-5'-P-CCNC: 22 U/L (ref 1–33)
ANION GAP SERPL CALCULATED.3IONS-SCNC: 9.4 MMOL/L (ref 5–15)
AST SERPL-CCNC: 24 U/L (ref 1–32)
BASOPHILS # BLD AUTO: 0.05 10*3/MM3 (ref 0–0.2)
BASOPHILS NFR BLD AUTO: 1 % (ref 0–1.5)
BILIRUB SERPL-MCNC: 0.3 MG/DL (ref 0–1.2)
BUN SERPL-MCNC: 19 MG/DL (ref 8–23)
BUN/CREAT SERPL: 27.1 (ref 7–25)
CALCIUM SPEC-SCNC: 9 MG/DL (ref 8.6–10.5)
CHLORIDE SERPL-SCNC: 105 MMOL/L (ref 98–107)
CHOLEST SERPL-MCNC: 140 MG/DL (ref 0–200)
CO2 SERPL-SCNC: 23.6 MMOL/L (ref 22–29)
CREAT SERPL-MCNC: 0.7 MG/DL (ref 0.57–1)
DEPRECATED RDW RBC AUTO: 46.3 FL (ref 37–54)
EGFRCR SERPLBLD CKD-EPI 2021: 98.5 ML/MIN/1.73
EOSINOPHIL # BLD AUTO: 0.15 10*3/MM3 (ref 0–0.4)
EOSINOPHIL NFR BLD AUTO: 3.1 % (ref 0.3–6.2)
ERYTHROCYTE [DISTWIDTH] IN BLOOD BY AUTOMATED COUNT: 13.9 % (ref 12.3–15.4)
FOLATE SERPL-MCNC: >20 NG/ML (ref 4.78–24.2)
GLOBULIN UR ELPH-MCNC: 2.9 GM/DL
GLUCOSE SERPL-MCNC: 98 MG/DL (ref 65–99)
HBA1C MFR BLD: 5.2 % (ref 4.8–5.6)
HCT VFR BLD AUTO: 37 % (ref 34–46.6)
HDLC SERPL-MCNC: 76 MG/DL (ref 40–60)
HGB BLD-MCNC: 12.5 G/DL (ref 12–15.9)
IMM GRANULOCYTES # BLD AUTO: 0.01 10*3/MM3 (ref 0–0.05)
IMM GRANULOCYTES NFR BLD AUTO: 0.2 % (ref 0–0.5)
LDLC SERPL CALC-MCNC: 48 MG/DL (ref 0–100)
LDLC/HDLC SERPL: 0.61 {RATIO}
LYMPHOCYTES # BLD AUTO: 1.71 10*3/MM3 (ref 0.7–3.1)
LYMPHOCYTES NFR BLD AUTO: 35.8 % (ref 19.6–45.3)
MCH RBC QN AUTO: 31.1 PG (ref 26.6–33)
MCHC RBC AUTO-ENTMCNC: 33.8 G/DL (ref 31.5–35.7)
MCV RBC AUTO: 92 FL (ref 79–97)
MONOCYTES # BLD AUTO: 0.29 10*3/MM3 (ref 0.1–0.9)
MONOCYTES NFR BLD AUTO: 6.1 % (ref 5–12)
NEUTROPHILS NFR BLD AUTO: 2.57 10*3/MM3 (ref 1.7–7)
NEUTROPHILS NFR BLD AUTO: 53.8 % (ref 42.7–76)
NRBC BLD AUTO-RTO: 0 /100 WBC (ref 0–0.2)
PLATELET # BLD AUTO: 199 10*3/MM3 (ref 140–450)
PMV BLD AUTO: 11.9 FL (ref 6–12)
POTASSIUM SERPL-SCNC: 3.9 MMOL/L (ref 3.5–5.2)
PROT SERPL-MCNC: 6.9 G/DL (ref 6–8.5)
RBC # BLD AUTO: 4.02 10*6/MM3 (ref 3.77–5.28)
RH BLD: POSITIVE
SODIUM SERPL-SCNC: 138 MMOL/L (ref 136–145)
T-UPTAKE NFR SERPL: 1.04 TBI (ref 0.8–1.3)
T4 SERPL-MCNC: 11.6 MCG/DL (ref 4.5–11.7)
TRIGL SERPL-MCNC: 88 MG/DL (ref 0–150)
TSH SERPL DL<=0.05 MIU/L-ACNC: 0.45 UIU/ML (ref 0.27–4.2)
VIT B12 BLD-MCNC: 380 PG/ML (ref 211–946)
VLDLC SERPL-MCNC: 16 MG/DL (ref 5–40)
WBC NRBC COR # BLD: 4.78 10*3/MM3 (ref 3.4–10.8)

## 2023-08-24 PROCEDURE — 82746 ASSAY OF FOLIC ACID SERUM: CPT

## 2023-08-24 PROCEDURE — 86900 BLOOD TYPING SEROLOGIC ABO: CPT

## 2023-08-24 PROCEDURE — 85025 COMPLETE CBC W/AUTO DIFF WBC: CPT

## 2023-08-24 PROCEDURE — 80061 LIPID PANEL: CPT

## 2023-08-24 PROCEDURE — 84443 ASSAY THYROID STIM HORMONE: CPT

## 2023-08-24 PROCEDURE — 36415 COLL VENOUS BLD VENIPUNCTURE: CPT

## 2023-08-24 PROCEDURE — 84436 ASSAY OF TOTAL THYROXINE: CPT

## 2023-08-24 PROCEDURE — 84479 ASSAY OF THYROID (T3 OR T4): CPT

## 2023-08-24 PROCEDURE — 80053 COMPREHEN METABOLIC PANEL: CPT

## 2023-08-24 PROCEDURE — 82306 VITAMIN D 25 HYDROXY: CPT

## 2023-08-24 PROCEDURE — 83036 HEMOGLOBIN GLYCOSYLATED A1C: CPT

## 2023-08-24 PROCEDURE — 82607 VITAMIN B-12: CPT

## 2023-08-24 PROCEDURE — 86901 BLOOD TYPING SEROLOGIC RH(D): CPT

## 2023-08-25 ENCOUNTER — TELEPHONE (OUTPATIENT)
Dept: FAMILY MEDICINE CLINIC | Facility: CLINIC | Age: 63
End: 2023-08-25
Payer: OTHER GOVERNMENT

## 2023-08-25 NOTE — TELEPHONE ENCOUNTER
Caller: Wade Loo    Relationship to patient: Self    Best call back number: [phone]    Chief complaint:FOLLOW UP ON NEW MEDICATION FOR WEIGHT LOSS    Type of visit: OFFICE VISIT    Requested date: BEFORE 09/18/2023      Additional notes: PATIENT IS GOING TO BE [place] OF [place] FOR ONE MONTH LEAVING ON 09/18/2023, THERE ARE NO APPOINTMENTS AVAILABLE UNTIL 09/29/2023 AND SHE NEEDS TO BE SEEN PRIOR TO HER TRIP    LAB WORK WAS MT 08/24/2023

## 2023-08-28 NOTE — PROGRESS NOTES
3rd Attempt - Called Wade Loo at [phone] to relay results, however was unable to reach patient. LVM asking to please return call.     Sent patient result letter via [employer].

## 2023-09-15 ENCOUNTER — OFFICE VISIT (OUTPATIENT)
Dept: FAMILY MEDICINE CLINIC | Facility: CLINIC | Age: 63
End: 2023-09-15
Payer: OTHER GOVERNMENT

## 2023-09-15 VITALS
HEART RATE: 92 BPM | OXYGEN SATURATION: 100 % | TEMPERATURE: 96.8 F | DIASTOLIC BLOOD PRESSURE: 76 MMHG | HEIGHT: 61 IN | WEIGHT: 159.4 LBS | SYSTOLIC BLOOD PRESSURE: 122 MMHG | BODY MASS INDEX: 30.09 KG/M2

## 2023-09-15 DIAGNOSIS — Z00.00 ANNUAL PHYSICAL EXAM: Primary | ICD-10-CM

## 2023-09-15 DIAGNOSIS — E66.09 CLASS 1 OBESITY DUE TO EXCESS CALORIES WITHOUT SERIOUS COMORBIDITY WITH BODY MASS INDEX (BMI) OF 32.0 TO 32.9 IN ADULT: ICD-10-CM

## 2023-09-15 PROCEDURE — 99396 PREV VISIT EST AGE 40-64: CPT | Performed by: NURSE PRACTITIONER

## 2023-09-15 RX ORDER — PHENTERMINE HYDROCHLORIDE 15 MG/1
15 CAPSULE ORAL EVERY MORNING
Qty: 30 CAPSULE | Refills: 0 | Status: SHIPPED | OUTPATIENT
Start: 2023-09-15

## 2023-09-15 NOTE — PROGRESS NOTES
"  ENCOUNTER DATE:  09/15/2023    Wade Loo / 62 y.o. / female      CHIEF COMPLAINT     Weight Check and Annual Exam      VITALS     Visit Vitals  /76   Pulse 92   Temp 96.8 °F (36 °C)   Ht 154.9 cm (61\")   Wt 72.3 kg (159 lb 6.4 oz)   SpO2 100%   BMI 30.12 kg/m²       BP Readings from Last 3 Encounters:   09/15/23 122/76   07/28/23 144/82   10/17/22 132/84     Wt Readings from Last 3 Encounters:   09/15/23 72.3 kg (159 lb 6.4 oz)   07/28/23 77 kg (169 lb 12.8 oz)   10/17/22 80.5 kg (177 lb 6.4 oz)      Body mass index is 30.12 kg/m².    MEDICATIONS     Current Outpatient Medications on File Prior to Visit   Medication Sig Dispense Refill    amitriptyline (ELAVIL) 100 MG tablet Take 1 tablet by mouth Every Night. 90 tablet 1    atorvastatin (LIPITOR) 10 MG tablet Take 1 tablet by mouth Daily. 90 tablet 1    Calcium Carbonate-Vitamin D (calcium-vitamin D) 500-200 MG-UNIT tablet per tablet Take 1 tablet by mouth Daily.      celecoxib (CeleBREX) 400 MG capsule Take 1 capsule by mouth Daily. 90 capsule 1    cyanocobalamin 1000 MCG/ML injection Inject 1 mL into the appropriate muscle as directed by prescriber Every 30 (Thirty) Days. 3 mL 0    Diclofenac Sodium (VOLTAREN) 1 % gel gel Apply 4 g topically to the appropriate area as directed 4 (Four) Times a Day As Needed (as needed for pain and swelling.). 100 g 2    gabapentin (NEURONTIN) 300 MG capsule Take 1 capsule by mouth Daily. 90 capsule 0    levothyroxine (SYNTHROID, LEVOTHROID) 175 MCG tablet Take 1 tablet by mouth Daily. 90 tablet 0    methocarbamol (ROBAXIN) 500 MG tablet Take 1 tablet by mouth 3 (Three) Times a Day. 90 tablet 3    pantoprazole (PROTONIX) 40 MG EC tablet Take 1 tablet by mouth Daily. 90 tablet 1    Syringe 25G X 1-1/2\" 3 ML misc 1 each Every 30 (Thirty) Days. 100 each 0    topiramate (TOPAMAX) 25 MG tablet TAKE 1 TABLET BY MOUTH EVERY MORNING 90 tablet 0    vitamin B-6 (PYRIDOXINE) 50 MG tablet Take 1 tablet by mouth Daily. 90 tablet 1 "    zolpidem (AMBIEN) 10 MG tablet Take 1 tablet by mouth At Night As Needed for Sleep. 90 tablet 1    [DISCONTINUED] estrogens, conjugated, (PREMARIN) 0.625 MG tablet Take 1 tablet by mouth Daily. Take daily for 21 days then do not take for 7 days. 90 tablet 1    [DISCONTINUED] phentermine 15 MG capsule Take 1 capsule by mouth Every Morning. 30 capsule 0     No current facility-administered medications on file prior to visit.         HISTORY OF PRESENT ILLNESS     Wade presents for annual health maintenance visit.  No results found for: HPVAPTIMA   Last health maintenance visit: approximately 1 year ago  General health: good  Lifestyle:  Attempting to lose weight?: Yes   Diet: eats a well balanced, healthy diet  Exercise: generally stays active (work/exercise)  Tobacco: Former smoker   Alcohol: occasional/infrequent  Work: Full-time  Sees Gynecologist?: No   Sindhu/Postmenopausal?: Yes   Domestic abuse concerns: No   Depression Screening:          PHQ-9 Depression Screening  Little interest or pleasure in doing things?     Feeling down, depressed, or hopeless?     Trouble falling or staying asleep, or sleeping too much?     Feeling tired or having little energy?     Poor appetite or overeating?     Feeling bad about yourself - or that you are a failure or have let yourself or your family down?     Trouble concentrating on things, such as reading the newspaper or watching television?     Moving or speaking so slowly that other people could have noticed? Or the opposite - being so fidgety or restless that you have been moving around a lot more than usual?     Thoughts that you would be better off dead, or of hurting yourself in some way?     PHQ-9 Total Score     If you checked off any problems, how difficult have these problems made it for you to do your work, take care of things at home, or get along with other people?           CEDRIC-7           Patient Care Team:  Mariel Craig APRN as PCP - General  (Nurse Practitioner)      ALLERGIES  Allergies   Allergen Reactions    Zoloft [Sertraline Hcl] Hives        PFSH:     The following portions of the patient's history were reviewed and updated as appropriate: Allergies / Current Medications / Past Medical History / Surgical History / Social History / Family History    PROBLEM LIST   Patient Active Problem List   Diagnosis    History of bariatric surgery       PAST MEDICAL HISTORY  Past Medical History:   Diagnosis Date    Broken bones 1967    Condition not found     Thyroid problems     Depression     Gallstone     Hemorrhoid     Hernia cerebri 2014    Hyperlipidemia     Night sweats     Sinus trouble        SURGICAL HISTORY  Past Surgical History:   Procedure Laterality Date    BARIATRIC SURGERY  2004     SECTION  1983    CHOLECYSTECTOMY      GASTRIC BYPASS  2004    HYSTERECTOMY  2004    [facility]       SOCIAL HISTORY  Social History     Socioeconomic History    Marital status:    Tobacco Use    Smoking status: Former     Packs/day: 1.00     Years: 23.00     Pack years: 23.00     Types: Cigarettes     Start date: 3/2/1982     Quit date: 2000     Years since quittin.4    Smokeless tobacco: Never   Vaping Use    Vaping Use: Never used   Substance and Sexual Activity    Alcohol use: Never    Drug use: Never    Sexual activity: Not Currently     Partners: Male     Birth control/protection: Hysterectomy     Comment: Historectomy       FAMILY HISTORY  Family History   Problem Relation Age of Onset    Stroke Mother     Diabetes Mother     Stroke Father     Heart disease Father     Stroke Other     Heart attack Other     Diabetes Other     Heart attack Other     Diabetes Daughter        IMMUNIZATION HISTORY  Immunization History   Administered Date(s) Administered    COVID-19 (PFIZER) BIVALENT 12+YRS 09/15/2022    COVID-19 (PFIZER) Purple Cap Monovalent 2021, 2021, 2021    Fluzone  (or Fluarix & Flulaval for VFC) >6mos 10/14/2021    Influenza, Unspecified 10/01/2020    Shingrix 03/02/2022, 09/15/2022         HPI  She comes in for annual physical exam, and weight check.  Patient was last seen 1 month ago was placed on low-dose phentermine and Topamax for weight loss.  Patient comes in today with a 10 pound weight loss, she states the medication is working well, she is not currently having any side effects of the medication and would like to continue on the Topamax and phentermine combo.  Patient's current BMI is now 30.12.  Blood pressure has actually improved and is 122/76.      Physical Exam  Vitals reviewed.   Constitutional:       Appearance: Normal appearance. She is well-developed. She is obese.      Comments: BMI 30.12   HENT:      Head: Normocephalic and atraumatic.   Eyes:      Conjunctiva/sclera: Conjunctivae normal.      Pupils: Pupils are equal, round, and reactive to light.   Cardiovascular:      Rate and Rhythm: Normal rate and regular rhythm.      Heart sounds: No murmur heard.  Pulmonary:      Effort: Pulmonary effort is normal.      Breath sounds: Normal breath sounds. No wheezing or rhonchi.   Skin:     General: Skin is warm and dry.   Neurological:      Mental Status: She is alert and oriented to person, place, and time.   Psychiatric:         Mood and Affect: Mood and affect normal.         Behavior: Behavior normal.         Thought Content: Thought content normal.         Judgment: Judgment normal.       REVIEWED DATA      Labs:    Lab Results   Component Value Date     08/24/2023    K 3.9 08/24/2023    CALCIUM 9.0 08/24/2023    AST 24 08/24/2023    ALT 22 08/24/2023    BUN 19 08/24/2023    CREATININE 0.70 08/24/2023    CREATININE 0.79 10/17/2022    CREATININE 0.81 08/11/2022    EGFRIFNONA 61 02/25/2022       Lab Results   Component Value Date    HGBA1C 5.20 08/24/2023    TSH 0.445 08/24/2023    FREET4 1.8 04/02/2021       Lab Results   Component Value Date    LDL 48  08/24/2023    HDL 76 (H) 08/24/2023    TRIG 88 08/24/2023    CHOLHDLRATIO 2.7 (L) 02/25/2021       Lab Results   Component Value Date    IZLF51JD 32.0 08/24/2023        Lab Results   Component Value Date    WBC 4.78 08/24/2023    HGB 12.5 08/24/2023    MCV 92.0 08/24/2023     08/24/2023       No results found for: PROTEIN, GLUCOSEU, BLOODU, NITRITEU, LEUKOCYTESUR       Lab Results   Component Value Date    HEPCVIRUSABY Non-Reactive 08/11/2022           ASSESSMENT & PLAN     Diagnoses and all orders for this visit:    1. Annual physical exam (Primary)    2. Class 1 obesity due to excess calories without serious comorbidity with body mass index (BMI) of 32.0 to 32.9 in adult  Comments:  Patient doing well on the medication we will continue on the Toprol mate 25 and 15 mg of phentermine, patient agreeable treatment plan  Orders:  -     phentermine 15 MG capsule; Take 1 capsule by mouth Every Morning.  Dispense: 30 capsule; Refill: 0    Other orders  -     estrogens, conjugated, (PREMARIN) 0.625 MG tablet; Take 1 tablet by mouth Daily. Take daily for 21 days then do not take for 7 days.  Dispense: 90 tablet; Refill: 1        HEALTHCARE MAINTENANCE ISSUES     Cancer Screening:  Colon: Initial/Next screening at age: CURRENT  Repeat colon cancer screening: every 3 years  Breast: Recommended monthly self exams; annual professional exam  Mammogram: every 1 year  Cervical: 1 year  Skin: Monthly self skin examination, annual exam by health professional      Lifestyle Counseling:  Lifestyle Modifications: Attempt to lose weight, Continue good lifestyle choices/modifications, and Improve dietary compliance  Safety Issues: Always wear seatbelt, Avoid texting while driving   Use sunscreen, regular skin examination  Recommended annual dental/vision examination.  Emotional/Stress/Sleep: Reviewed and  given when appropriate    Part of this note may be electronic transcription/translation of spoken language to printed  text using the Dragon dictation system

## 2023-10-17 DIAGNOSIS — E66.09 CLASS 1 OBESITY DUE TO EXCESS CALORIES WITHOUT SERIOUS COMORBIDITY WITH BODY MASS INDEX (BMI) OF 32.0 TO 32.9 IN ADULT: ICD-10-CM

## 2023-10-17 DIAGNOSIS — E53.8 VITAMIN B12 DEFICIENCY: ICD-10-CM

## 2023-10-18 ENCOUNTER — PRIOR AUTHORIZATION (OUTPATIENT)
Dept: FAMILY MEDICINE CLINIC | Facility: CLINIC | Age: 63
End: 2023-10-18
Payer: OTHER GOVERNMENT

## 2023-10-18 RX ORDER — TOPIRAMATE 25 MG/1
25 TABLET ORAL
Qty: 90 TABLET | Refills: 0 | Status: SHIPPED | OUTPATIENT
Start: 2023-10-18

## 2023-10-18 RX ORDER — METHOCARBAMOL 500 MG/1
500 TABLET, FILM COATED ORAL 3 TIMES DAILY
Qty: 90 TABLET | Refills: 3 | Status: SHIPPED | OUTPATIENT
Start: 2023-10-18

## 2023-10-18 RX ORDER — GABAPENTIN 300 MG/1
300 CAPSULE ORAL DAILY
Qty: 90 CAPSULE | Refills: 0 | Status: SHIPPED | OUTPATIENT
Start: 2023-10-18

## 2023-10-18 RX ORDER — CYANOCOBALAMIN 1000 UG/ML
1000 INJECTION, SOLUTION INTRAMUSCULAR; SUBCUTANEOUS
Qty: 3 ML | Refills: 0 | Status: SHIPPED | OUTPATIENT
Start: 2023-10-18

## 2023-10-18 RX ORDER — ATORVASTATIN CALCIUM 10 MG/1
10 TABLET, FILM COATED ORAL DAILY
Qty: 90 TABLET | Refills: 1 | Status: SHIPPED | OUTPATIENT
Start: 2023-10-18

## 2023-10-18 RX ORDER — PHENTERMINE HYDROCHLORIDE 15 MG/1
15 CAPSULE ORAL EVERY MORNING
Qty: 30 CAPSULE | Refills: 0 | Status: SHIPPED | OUTPATIENT
Start: 2023-10-18

## 2023-10-18 RX ORDER — LEVOTHYROXINE SODIUM 175 UG/1
175 TABLET ORAL DAILY
Qty: 90 TABLET | Refills: 0 | Status: SHIPPED | OUTPATIENT
Start: 2023-10-18

## 2023-10-19 NOTE — TELEPHONE ENCOUNTER
CoverMyMeds    Key: [id number] - PA Case ID: [id number]    Drug - Phentermine HCl 15MG capsules    Form -  Electronic PA Form (2017 N[employer])    Outcome  Approved on October 18, 2023    Case Id: [id number]  Status: Approved  Review Type: Prior Auth  Coverage Start Date: 09/18/2023  Coverage End Date: 01/16/2024

## 2023-12-05 DIAGNOSIS — E66.09 CLASS 1 OBESITY DUE TO EXCESS CALORIES WITHOUT SERIOUS COMORBIDITY WITH BODY MASS INDEX (BMI) OF 32.0 TO 32.9 IN ADULT: ICD-10-CM

## 2023-12-05 NOTE — TELEPHONE ENCOUNTER
Medication Refill Request      Medication:  phentermine 15 MG capsule       Quantity left:  0      Pharmacy:  [facility]      Call back when called to pharmacy: YES

## 2023-12-06 RX ORDER — PHENTERMINE HYDROCHLORIDE 15 MG/1
15 CAPSULE ORAL EVERY MORNING
Qty: 30 CAPSULE | Refills: 0 | Status: SHIPPED | OUTPATIENT
Start: 2023-12-06

## 2023-12-20 DIAGNOSIS — G47.00 INSOMNIA, UNSPECIFIED TYPE: Primary | ICD-10-CM

## 2023-12-20 RX ORDER — ZOLPIDEM TARTRATE 10 MG/1
10 TABLET ORAL NIGHTLY PRN
Qty: 90 TABLET | Refills: 1 | Status: SHIPPED | OUTPATIENT
Start: 2023-12-20

## 2023-12-20 RX ORDER — ATORVASTATIN CALCIUM 10 MG/1
10 TABLET, FILM COATED ORAL DAILY
Qty: 90 TABLET | Refills: 1 | Status: SHIPPED | OUTPATIENT
Start: 2023-12-20

## 2023-12-20 RX ORDER — AMITRIPTYLINE HYDROCHLORIDE 100 MG/1
100 TABLET ORAL NIGHTLY
Qty: 90 TABLET | Refills: 1 | Status: SHIPPED | OUTPATIENT
Start: 2023-12-20

## 2023-12-20 NOTE — TELEPHONE ENCOUNTER
zolpidem (AMBIEN) 10 MG tablet   amitriptyline (ELAVIL) 100 MG tablet   atorvastatin (LIPITOR) 10 MG tablet     Patient is needing a refill of the above medications, she has enough for about 3 days    Please advise

## 2024-02-04 DIAGNOSIS — E66.09 CLASS 1 OBESITY DUE TO EXCESS CALORIES WITHOUT SERIOUS COMORBIDITY WITH BODY MASS INDEX (BMI) OF 32.0 TO 32.9 IN ADULT: ICD-10-CM

## 2024-02-04 RX ORDER — PHENTERMINE HYDROCHLORIDE 15 MG/1
15 CAPSULE ORAL EVERY MORNING
Qty: 30 CAPSULE | Refills: 0 | Status: CANCELLED | OUTPATIENT
Start: 2024-02-04

## 2024-02-05 RX ORDER — GABAPENTIN 300 MG/1
300 CAPSULE ORAL DAILY
Qty: 90 CAPSULE | Refills: 0 | Status: SHIPPED | OUTPATIENT
Start: 2024-02-05 | End: 2024-02-08 | Stop reason: SDUPTHER

## 2024-02-05 RX ORDER — LEVOTHYROXINE SODIUM 175 UG/1
175 TABLET ORAL DAILY
Qty: 90 TABLET | Refills: 0 | Status: SHIPPED | OUTPATIENT
Start: 2024-02-05

## 2024-02-05 RX ORDER — TOPIRAMATE 25 MG/1
25 TABLET ORAL
Qty: 90 TABLET | Refills: 0 | Status: SHIPPED | OUTPATIENT
Start: 2024-02-05

## 2024-02-05 RX ORDER — ATORVASTATIN CALCIUM 10 MG/1
10 TABLET, FILM COATED ORAL DAILY
Qty: 90 TABLET | Refills: 1 | Status: SHIPPED | OUTPATIENT
Start: 2024-02-05

## 2024-02-08 RX ORDER — GABAPENTIN 300 MG/1
300 CAPSULE ORAL DAILY
Qty: 90 CAPSULE | Refills: 0 | Status: SHIPPED | OUTPATIENT
Start: 2024-02-08

## 2024-03-06 ENCOUNTER — TELEPHONE (OUTPATIENT)
Dept: FAMILY MEDICINE CLINIC | Facility: CLINIC | Age: 64
End: 2024-03-06
Payer: OTHER GOVERNMENT

## 2024-03-06 NOTE — TELEPHONE ENCOUNTER
Caller: KALPESH JN     Relationship: SELF     Best call back number: [phone]     Requested Prescriptions:   PHENTERMINE 15 MG CAP     Pharmacy where request should be sent: [facility]    Additional details provided by patient: PLEASE CALL PT WHEN SENT TO PHARMACY. THANK YOU     Does the patient have less than a 3 day supply:  [x] Yes  [] No     Would you like a call back once the refill request has been completed: [x] Yes [] No     If the office needs to give you a call back, can they leave a voicemail: [x] Yes [] No

## 2024-03-07 NOTE — TELEPHONE ENCOUNTER
Left patient a voicemail informing her she can call and make a video visit or in person visit to get this prescription and to call back with any questions

## 2024-03-08 RX ORDER — GABAPENTIN 300 MG/1
300 CAPSULE ORAL DAILY
Qty: 90 CAPSULE | Refills: 0 | OUTPATIENT
Start: 2024-03-08

## 2024-03-08 NOTE — TELEPHONE ENCOUNTER
Mr Escobar has been hear 3 times this week asking for a refill on his wife's medication for     gabapentin (NEURONTIN) 300 MG capsule     [facility] to [facility]

## 2024-03-08 NOTE — TELEPHONE ENCOUNTER
Patient requested medication refill too soon, she should have enough medication to last her until May unless she only got a partial refill.  According to pharmacy she picked up her medication in February, patient takes 1 capsule daily, patient was sent 90 capsules should not be ready for refill for 90 days.

## 2024-03-22 ENCOUNTER — OFFICE VISIT (OUTPATIENT)
Dept: FAMILY MEDICINE CLINIC | Facility: CLINIC | Age: 64
End: 2024-03-22
Payer: OTHER GOVERNMENT

## 2024-03-22 VITALS
HEIGHT: 61 IN | BODY MASS INDEX: 30.4 KG/M2 | WEIGHT: 161 LBS | SYSTOLIC BLOOD PRESSURE: 130 MMHG | TEMPERATURE: 97.4 F | DIASTOLIC BLOOD PRESSURE: 77 MMHG | OXYGEN SATURATION: 99 % | HEART RATE: 74 BPM

## 2024-03-22 DIAGNOSIS — Z09 FOLLOW-UP EXAM: Primary | ICD-10-CM

## 2024-03-22 DIAGNOSIS — E53.8 VITAMIN B12 DEFICIENCY: ICD-10-CM

## 2024-03-22 DIAGNOSIS — E55.9 VITAMIN D DEFICIENCY: ICD-10-CM

## 2024-03-22 DIAGNOSIS — E66.09 CLASS 1 OBESITY DUE TO EXCESS CALORIES WITH SERIOUS COMORBIDITY AND BODY MASS INDEX (BMI) OF 30.0 TO 30.9 IN ADULT: ICD-10-CM

## 2024-03-22 DIAGNOSIS — E03.9 ACQUIRED HYPOTHYROIDISM: ICD-10-CM

## 2024-03-22 DIAGNOSIS — Z13.220 LIPID SCREENING: ICD-10-CM

## 2024-03-22 LAB
25(OH)D3 SERPL-MCNC: 34 NG/ML (ref 30–100)
ALBUMIN SERPL-MCNC: 4.2 G/DL (ref 3.5–5.2)
ALBUMIN/GLOB SERPL: 1.4 G/DL
ALP SERPL-CCNC: 70 U/L (ref 39–117)
ALT SERPL W P-5'-P-CCNC: 21 U/L (ref 1–33)
ANION GAP SERPL CALCULATED.3IONS-SCNC: 11.2 MMOL/L (ref 5–15)
AST SERPL-CCNC: 20 U/L (ref 1–32)
BASOPHILS # BLD MANUAL: 0.06 10*3/MM3 (ref 0–0.2)
BASOPHILS NFR BLD MANUAL: 1 % (ref 0–1.5)
BILIRUB SERPL-MCNC: 0.2 MG/DL (ref 0–1.2)
BUN SERPL-MCNC: 25 MG/DL (ref 8–23)
BUN/CREAT SERPL: 32.1 (ref 7–25)
CALCIUM SPEC-SCNC: 9.4 MG/DL (ref 8.6–10.5)
CHLORIDE SERPL-SCNC: 111 MMOL/L (ref 98–107)
CHOLEST SERPL-MCNC: 178 MG/DL (ref 0–200)
CO2 SERPL-SCNC: 24.8 MMOL/L (ref 22–29)
CREAT SERPL-MCNC: 0.78 MG/DL (ref 0.57–1)
DEPRECATED RDW RBC AUTO: 42.9 FL (ref 37–54)
EGFRCR SERPLBLD CKD-EPI 2021: 86 ML/MIN/1.73
EOSINOPHIL # BLD MANUAL: 0.12 10*3/MM3 (ref 0–0.4)
EOSINOPHIL NFR BLD MANUAL: 2 % (ref 0.3–6.2)
ERYTHROCYTE [DISTWIDTH] IN BLOOD BY AUTOMATED COUNT: 12.7 % (ref 12.3–15.4)
FOLATE SERPL-MCNC: 17.4 NG/ML (ref 4.78–24.2)
GLOBULIN UR ELPH-MCNC: 2.9 GM/DL
GLUCOSE SERPL-MCNC: 96 MG/DL (ref 65–99)
HCT VFR BLD AUTO: 36.7 % (ref 34–46.6)
HDLC SERPL-MCNC: 99 MG/DL (ref 40–60)
HGB BLD-MCNC: 12.2 G/DL (ref 12–15.9)
LDLC SERPL CALC-MCNC: 66 MG/DL (ref 0–100)
LDLC/HDLC SERPL: 0.66 {RATIO}
LYMPHOCYTES # BLD MANUAL: 1.75 10*3/MM3 (ref 0.7–3.1)
LYMPHOCYTES NFR BLD MANUAL: 2 % (ref 5–12)
MCH RBC QN AUTO: 30.6 PG (ref 26.6–33)
MCHC RBC AUTO-ENTMCNC: 33.2 G/DL (ref 31.5–35.7)
MCV RBC AUTO: 92 FL (ref 79–97)
MONOCYTES # BLD: 0.12 10*3/MM3 (ref 0.1–0.9)
NEUTROPHILS # BLD AUTO: 4.05 10*3/MM3 (ref 1.7–7)
NEUTROPHILS NFR BLD MANUAL: 66.3 % (ref 42.7–76)
PLAT MORPH BLD: NORMAL
PLATELET # BLD AUTO: 216 10*3/MM3 (ref 140–450)
PMV BLD AUTO: 11.7 FL (ref 6–12)
POIKILOCYTOSIS BLD QL SMEAR: ABNORMAL
POTASSIUM SERPL-SCNC: 5.5 MMOL/L (ref 3.5–5.2)
PROT SERPL-MCNC: 7.1 G/DL (ref 6–8.5)
RBC # BLD AUTO: 3.99 10*6/MM3 (ref 3.77–5.28)
SODIUM SERPL-SCNC: 147 MMOL/L (ref 136–145)
T-UPTAKE NFR SERPL: 1.17 TBI (ref 0.8–1.3)
T4 SERPL-MCNC: 10.6 MCG/DL (ref 4.5–11.7)
TRIGL SERPL-MCNC: 66 MG/DL (ref 0–150)
TSH SERPL DL<=0.05 MIU/L-ACNC: 0.75 UIU/ML (ref 0.27–4.2)
VARIANT LYMPHS NFR BLD MANUAL: 28.6 % (ref 19.6–45.3)
VIT B12 BLD-MCNC: 346 PG/ML (ref 211–946)
VLDLC SERPL-MCNC: 13 MG/DL (ref 5–40)
WBC MORPH BLD: NORMAL
WBC NRBC COR # BLD AUTO: 6.11 10*3/MM3 (ref 3.4–10.8)

## 2024-03-22 PROCEDURE — 84479 ASSAY OF THYROID (T3 OR T4): CPT | Performed by: NURSE PRACTITIONER

## 2024-03-22 PROCEDURE — 84436 ASSAY OF TOTAL THYROXINE: CPT | Performed by: NURSE PRACTITIONER

## 2024-03-22 PROCEDURE — 80061 LIPID PANEL: CPT | Performed by: NURSE PRACTITIONER

## 2024-03-22 PROCEDURE — 85007 BL SMEAR W/DIFF WBC COUNT: CPT | Performed by: NURSE PRACTITIONER

## 2024-03-22 PROCEDURE — 82306 VITAMIN D 25 HYDROXY: CPT | Performed by: NURSE PRACTITIONER

## 2024-03-22 PROCEDURE — 80053 COMPREHEN METABOLIC PANEL: CPT | Performed by: NURSE PRACTITIONER

## 2024-03-22 PROCEDURE — 84443 ASSAY THYROID STIM HORMONE: CPT | Performed by: NURSE PRACTITIONER

## 2024-03-22 PROCEDURE — 82607 VITAMIN B-12: CPT | Performed by: NURSE PRACTITIONER

## 2024-03-22 PROCEDURE — 82746 ASSAY OF FOLIC ACID SERUM: CPT | Performed by: NURSE PRACTITIONER

## 2024-03-22 PROCEDURE — 85027 COMPLETE CBC AUTOMATED: CPT | Performed by: NURSE PRACTITIONER

## 2024-03-22 RX ORDER — PHENTERMINE HYDROCHLORIDE 37.5 MG/1
37.5 CAPSULE ORAL EVERY MORNING
Qty: 30 CAPSULE | Refills: 2 | Status: SHIPPED | OUTPATIENT
Start: 2024-03-22

## 2024-03-22 NOTE — PROGRESS NOTES
Chief Complaint  Weight Check (phentermine 15 MG capsule/topiramate (TOPAMAX) 25 MG tablet)    Subjective        Medical History: has a past medical history of Broken bones (), Condition not found, Depression, Gallstone (), Hemorrhoid, Hernia cerebri (), Hyperlipidemia, Night sweats, and Sinus trouble.     Surgical History: has a past surgical history that includes  section ( 1998 ); Gastric bypass (); Hysterectomy (); Other surgical history (); Bariatric Surgery (); and Cholecystectomy ().     Family History: family history includes Diabetes in her daughter, mother, and another family member; Heart attack in some other family members; Heart disease in her father; Stroke in her father, mother, and another family member.     Social History: reports that she quit smoking about 23 years ago. Her smoking use included cigarettes. She started smoking about 42 years ago. She has a 23 pack-year smoking history. She has never used smokeless tobacco. She reports that she does not drink alcohol and does not use drugs.    Wade Loo presents to [facility]  History of Present Illness  Hypothyroidism  This is a chronic problem. The current episode started more than 1 year ago. The problem occurs rarely. Associated symptoms include arthralgias, joint swelling and myalgias and fatigue. Patient states that she is feeling more sluggish and would like for her thyroid levels to be checked. Pertinent negatives include no abdominal pain or sore throat. Nothing aggravates the symptoms. Treatments tried: levothyroxin. The treatment provided significant relief.   Vitamin B12 deficiency  Chronic condition, patient has had the condition for over a year.  Condition is stable, patient is currently taking vitamin B12 injections once a month.  Associated symptoms initially were fatigue, dry skin, and myalgia.  Patient is doing well on vitamin supplements and is  "requesting refill today.  Vitamin D deficiency  This is a chronic problem.  Current episode has been for longer than 6 months.  Problem has gradually been improving since he has been on vitamin D supplements.  He denies any excessive fatigue, hair loss, skin changes due to the vitamin D deficiency.  He tries to eat a well-balanced diet.  He has been on the vitamin D weekly, no compliance problems.  Condition is stable.  Objective   Vital Signs:   /77 (BP Location: Right arm, Patient Position: Sitting, Cuff Size: Adult)   Pulse 74   Temp 97.4 °F (36.3 °C) (Infrared)   Ht 154.9 cm (61\")   Wt 73 kg (161 lb)   SpO2 99%   BMI 30.42 kg/m²       Wt Readings from Last 3 Encounters:   03/22/24 73 kg (161 lb)   09/15/23 72.3 kg (159 lb 6.4 oz)   07/28/23 77 kg (169 lb 12.8 oz)        BP Readings from Last 3 Encounters:   03/22/24 130/77   09/15/23 122/76   07/28/23 144/82        Physical Exam  Vitals reviewed.   Constitutional:       Appearance: Normal appearance. She is well-developed.   HENT:      Head: Normocephalic and atraumatic.   Eyes:      Conjunctiva/sclera: Conjunctivae normal.      Pupils: Pupils are equal, round, and reactive to light.   Cardiovascular:      Rate and Rhythm: Normal rate and regular rhythm.      Heart sounds: No murmur heard.  Pulmonary:      Effort: Pulmonary effort is normal.      Breath sounds: Normal breath sounds. No wheezing or rhonchi.   Skin:     General: Skin is warm and dry.   Neurological:      Mental Status: She is alert and oriented to person, place, and time.   Psychiatric:         Mood and Affect: Mood and affect normal.         Behavior: Behavior normal.         Thought Content: Thought content normal.         Judgment: Judgment normal.        Result Review :  {The following data was reviewed by ARLEEN Tierney on 03/22/2024.  Common labs          8/24/2023    14:28   Common Labs   Glucose 98    BUN 19    Creatinine 0.70    Sodium 138    Potassium 3.9  " "  Chloride 105    Calcium 9.0    Albumin 4.0    Total Bilirubin 0.3    Alkaline Phosphatase 56    AST (SGOT) 24    ALT (SGPT) 22    WBC 4.78    Hemoglobin 12.5    Hematocrit 37.0    Platelets 199    Total Cholesterol 140    Triglycerides 88    HDL Cholesterol 76    LDL Cholesterol  48    Hemoglobin A1C 5.20      Data reviewed : Previous office note             Current Outpatient Medications on File Prior to Visit   Medication Sig Dispense Refill    amitriptyline (ELAVIL) 100 MG tablet Take 1 tablet by mouth Every Night. 90 tablet 1    atorvastatin (LIPITOR) 10 MG tablet Take 1 tablet by mouth Daily. 90 tablet 1    Calcium Carbonate-Vitamin D (calcium-vitamin D) 500-200 MG-UNIT tablet per tablet Take 1 tablet by mouth Daily.      celecoxib (CeleBREX) 400 MG capsule Take 1 capsule by mouth Daily. 90 capsule 1    cyanocobalamin 1000 MCG/ML injection Inject 1 mL into the appropriate muscle as directed by prescriber Every 30 (Thirty) Days. 3 mL 0    Diclofenac Sodium (VOLTAREN) 1 % gel gel Apply 4 g topically to the appropriate area as directed 4 (Four) Times a Day As Needed (as needed for pain and swelling.). 100 g 2    estrogens, conjugated, (PREMARIN) 0.625 MG tablet Take 1 tablet by mouth Daily. Take daily for 21 days then do not take for 7 days. 90 tablet 1    gabapentin (NEURONTIN) 300 MG capsule Take 1 capsule by mouth Daily. 90 capsule 0    levothyroxine (SYNTHROID, LEVOTHROID) 175 MCG tablet Take 1 tablet by mouth Daily. 90 tablet 0    methocarbamol (ROBAXIN) 500 MG tablet Take 1 tablet by mouth 3 (Three) Times a Day. 90 tablet 3    pantoprazole (PROTONIX) 40 MG EC tablet Take 1 tablet by mouth Daily. 90 tablet 1    Syringe 25G X 1-1/2\" 3 ML misc 1 each Every 30 (Thirty) Days. 100 each 0    topiramate (TOPAMAX) 25 MG tablet Take 1 tablet by mouth Every Morning. 90 tablet 0    vitamin B-6 (PYRIDOXINE) 50 MG tablet Take 1 tablet by mouth Daily. 90 tablet 1    zolpidem (AMBIEN) 10 MG tablet Take 1 tablet by mouth " At Night As Needed for Sleep. 90 tablet 1    [DISCONTINUED] phentermine 15 MG capsule Take 1 capsule by mouth Every Morning. 30 capsule 0     No current facility-administered medications on file prior to visit.        Assessment and Plan  Diagnoses and all orders for this visit:    1. Follow-up exam (Primary)    2. Vitamin B12 deficiency  -     Vitamin B12 & Folate  -     CBC With Manual Differential    3. Vitamin D deficiency  -     Vitamin D,25-Hydroxy    4. Acquired hypothyroidism  -     Thyroid Panel With TSH  -     CBC With Manual Differential  -     Comprehensive Metabolic Panel    5. Lipid screening  -     Lipid Panel    6. Class 1 obesity due to excess calories with serious comorbidity and body mass index (BMI) of 30.0 to 30.9 in adult  -     phentermine 37.5 MG capsule; Take 1 capsule by mouth Every Morning.  Dispense: 30 capsule; Refill: 2    Patient has been on the phentermine Toprol made, she has not lost a lot of weight.  She does admit that her and her spouse eat out on a regular basis but she tries to limit portion size.  Discussed with patient to start eating healthier, will increase phentermine with a 2-month follow-up.  Patient verbalized understanding.    Will check labs, adjust medication if needed, patient is agreeable    Follow Up   Return in about 3 months (around 6/22/2024) for Recheck.  Patient was given instructions and counseling regarding her condition or for health maintenance advice. Please see specific information pulled into the AVS if appropriate.       Part of this note may be electronic transcription/translation of spoken language to printed text using the Dragon dictation system

## 2024-03-25 ENCOUNTER — TELEPHONE (OUTPATIENT)
Dept: FAMILY MEDICINE CLINIC | Facility: CLINIC | Age: 64
End: 2024-03-25
Payer: OTHER GOVERNMENT

## 2024-03-25 DIAGNOSIS — E87.5 HYPERKALEMIA: Primary | ICD-10-CM

## 2024-03-25 NOTE — TELEPHONE ENCOUNTER
Pharmacy Name:      [facility]        Reference Number (if applicable): NO    Pharmacy representative name: ALEX     Pharmacy representative phone number: [phone]     What medication are you calling in regards to: PHENTERMINE 37.5 MG CAPSULE      What question does the pharmacy have: ALEX STATED THAT THEY ARE REQUESTING REPLACEMENT MEDICATION OF PHENTERMINE 37.5 MG TABLET SINCE THEY DON'T HAVE CAPSULES     Who is the provider that prescribed the medication: ELISA [facility]

## 2024-03-25 NOTE — PROGRESS NOTES
Called Wade Loo at [phone] (M) to relay results and repeat lab guidance, however was unable to reach patient. LVM detailing information and asking to please return call if any questions/concerns.

## 2024-03-27 RX ORDER — PHENTERMINE HYDROCHLORIDE 37.5 MG/1
37.5 TABLET ORAL
Qty: 30 TABLET | Refills: 2 | Status: SHIPPED | OUTPATIENT
Start: 2024-03-27

## 2024-04-05 ENCOUNTER — CLINICAL SUPPORT (OUTPATIENT)
Dept: FAMILY MEDICINE CLINIC | Facility: CLINIC | Age: 64
End: 2024-04-05
Payer: OTHER GOVERNMENT

## 2024-04-05 DIAGNOSIS — E87.5 HYPERKALEMIA: ICD-10-CM

## 2024-04-05 LAB
ANION GAP SERPL CALCULATED.3IONS-SCNC: 9 MMOL/L (ref 5–15)
BUN SERPL-MCNC: 16 MG/DL (ref 8–23)
BUN/CREAT SERPL: 18.6 (ref 7–25)
CALCIUM SPEC-SCNC: 8.5 MG/DL (ref 8.6–10.5)
CHLORIDE SERPL-SCNC: 103 MMOL/L (ref 98–107)
CO2 SERPL-SCNC: 24 MMOL/L (ref 22–29)
CREAT SERPL-MCNC: 0.86 MG/DL (ref 0.57–1)
EGFRCR SERPLBLD CKD-EPI 2021: 76.5 ML/MIN/1.73
GLUCOSE SERPL-MCNC: 166 MG/DL (ref 65–99)
POTASSIUM SERPL-SCNC: 3.9 MMOL/L (ref 3.5–5.2)
SODIUM SERPL-SCNC: 136 MMOL/L (ref 136–145)

## 2024-04-05 PROCEDURE — 36415 COLL VENOUS BLD VENIPUNCTURE: CPT | Performed by: NURSE PRACTITIONER

## 2024-04-05 PROCEDURE — 80048 BASIC METABOLIC PNL TOTAL CA: CPT | Performed by: NURSE PRACTITIONER

## 2024-06-11 DIAGNOSIS — G47.00 INSOMNIA, UNSPECIFIED TYPE: ICD-10-CM

## 2024-06-11 DIAGNOSIS — E53.8 VITAMIN B12 DEFICIENCY: ICD-10-CM

## 2024-06-11 RX ORDER — TOPIRAMATE 25 MG/1
25 TABLET ORAL
Qty: 90 TABLET | Refills: 0 | Status: SHIPPED | OUTPATIENT
Start: 2024-06-11

## 2024-06-11 RX ORDER — LEVOTHYROXINE SODIUM 175 UG/1
175 TABLET ORAL DAILY
Qty: 90 TABLET | Refills: 0 | Status: SHIPPED | OUTPATIENT
Start: 2024-06-11

## 2024-06-12 RX ORDER — ZOLPIDEM TARTRATE 10 MG/1
10 TABLET ORAL NIGHTLY PRN
Qty: 90 TABLET | Refills: 1 | Status: SHIPPED | OUTPATIENT
Start: 2024-06-12

## 2024-06-12 RX ORDER — CYANOCOBALAMIN 1000 UG/ML
1000 INJECTION, SOLUTION INTRAMUSCULAR; SUBCUTANEOUS
Qty: 3 ML | Refills: 0 | Status: SHIPPED | OUTPATIENT
Start: 2024-06-12

## 2024-07-26 DIAGNOSIS — E66.09 CLASS 1 OBESITY DUE TO EXCESS CALORIES WITH SERIOUS COMORBIDITY AND BODY MASS INDEX (BMI) OF 30.0 TO 30.9 IN ADULT: Primary | ICD-10-CM

## 2024-07-26 DIAGNOSIS — G47.00 INSOMNIA, UNSPECIFIED TYPE: ICD-10-CM

## 2024-07-29 ENCOUNTER — TELEPHONE (OUTPATIENT)
Dept: FAMILY MEDICINE CLINIC | Facility: CLINIC | Age: 64
End: 2024-07-29
Payer: OTHER GOVERNMENT

## 2024-07-29 RX ORDER — ZOLPIDEM TARTRATE 10 MG/1
10 TABLET ORAL NIGHTLY PRN
Qty: 90 TABLET | Refills: 1 | Status: SHIPPED | OUTPATIENT
Start: 2024-07-29

## 2024-07-29 RX ORDER — PANTOPRAZOLE SODIUM 40 MG/1
40 TABLET, DELAYED RELEASE ORAL DAILY
Qty: 90 TABLET | Refills: 1 | Status: SHIPPED | OUTPATIENT
Start: 2024-07-29

## 2024-07-29 RX ORDER — GABAPENTIN 300 MG/1
300 CAPSULE ORAL DAILY
Qty: 90 CAPSULE | Refills: 0 | Status: SHIPPED | OUTPATIENT
Start: 2024-07-29

## 2024-07-29 RX ORDER — ATORVASTATIN CALCIUM 10 MG/1
10 TABLET, FILM COATED ORAL DAILY
Qty: 90 TABLET | Refills: 1 | Status: SHIPPED | OUTPATIENT
Start: 2024-07-29

## 2024-07-29 RX ORDER — PHENTERMINE HYDROCHLORIDE 37.5 MG/1
37.5 TABLET ORAL
Qty: 30 TABLET | Refills: 2 | Status: SHIPPED | OUTPATIENT
Start: 2024-07-29

## 2024-07-29 NOTE — TELEPHONE ENCOUNTER
Left message for pt letting her know she can come in and  her paper script from [facility] whenever she wants.

## 2024-08-13 RX ORDER — AMITRIPTYLINE HYDROCHLORIDE 100 MG/1
100 TABLET ORAL NIGHTLY
Qty: 90 TABLET | Refills: 1 | Status: SHIPPED | OUTPATIENT
Start: 2024-08-13

## 2024-10-25 RX ORDER — TOPIRAMATE 25 MG/1
25 TABLET, FILM COATED ORAL
Qty: 90 TABLET | Refills: 1 | Status: SHIPPED | OUTPATIENT
Start: 2024-10-25

## 2024-10-25 RX ORDER — LEVOTHYROXINE SODIUM 175 UG/1
175 TABLET ORAL DAILY
Qty: 30 TABLET | Refills: 0 | Status: SHIPPED | OUTPATIENT
Start: 2024-10-25

## 2024-10-30 ENCOUNTER — OFFICE VISIT (OUTPATIENT)
Dept: FAMILY MEDICINE CLINIC | Facility: CLINIC | Age: 64
End: 2024-10-30
Payer: OTHER GOVERNMENT

## 2024-10-30 VITALS
TEMPERATURE: 97.8 F | WEIGHT: 160 LBS | HEART RATE: 83 BPM | BODY MASS INDEX: 30.21 KG/M2 | HEIGHT: 61 IN | SYSTOLIC BLOOD PRESSURE: 120 MMHG | DIASTOLIC BLOOD PRESSURE: 80 MMHG | OXYGEN SATURATION: 99 %

## 2024-10-30 DIAGNOSIS — E78.2 MIXED HYPERLIPIDEMIA: ICD-10-CM

## 2024-10-30 DIAGNOSIS — G47.00 INSOMNIA, UNSPECIFIED TYPE: ICD-10-CM

## 2024-10-30 DIAGNOSIS — N89.8 VAGINAL ODOR: ICD-10-CM

## 2024-10-30 DIAGNOSIS — E03.9 ACQUIRED HYPOTHYROIDISM: ICD-10-CM

## 2024-10-30 DIAGNOSIS — Z00.00 ANNUAL PHYSICAL EXAM: Primary | ICD-10-CM

## 2024-10-30 DIAGNOSIS — E66.09 CLASS 1 OBESITY DUE TO EXCESS CALORIES WITH SERIOUS COMORBIDITY AND BODY MASS INDEX (BMI) OF 30.0 TO 30.9 IN ADULT: ICD-10-CM

## 2024-10-30 DIAGNOSIS — E53.8 VITAMIN B12 DEFICIENCY: ICD-10-CM

## 2024-10-30 DIAGNOSIS — E55.9 VITAMIN D DEFICIENCY: ICD-10-CM

## 2024-10-30 DIAGNOSIS — Z12.31 ENCOUNTER FOR SCREENING MAMMOGRAM FOR MALIGNANT NEOPLASM OF BREAST: ICD-10-CM

## 2024-10-30 DIAGNOSIS — E66.811 CLASS 1 OBESITY DUE TO EXCESS CALORIES WITH SERIOUS COMORBIDITY AND BODY MASS INDEX (BMI) OF 30.0 TO 30.9 IN ADULT: ICD-10-CM

## 2024-10-30 LAB
BASOPHILS # BLD AUTO: 0.07 10*3/MM3 (ref 0–0.2)
BASOPHILS NFR BLD AUTO: 1.2 % (ref 0–1.5)
CANDIDA SPECIES: NEGATIVE
DEPRECATED RDW RBC AUTO: 44.9 FL (ref 37–54)
EOSINOPHIL # BLD AUTO: 0.11 10*3/MM3 (ref 0–0.4)
EOSINOPHIL NFR BLD AUTO: 1.9 % (ref 0.3–6.2)
ERYTHROCYTE [DISTWIDTH] IN BLOOD BY AUTOMATED COUNT: 13.3 % (ref 12.3–15.4)
GARDNERELLA VAGINALIS: POSITIVE
HCT VFR BLD AUTO: 34.3 % (ref 34–46.6)
HGB BLD-MCNC: 11.8 G/DL (ref 12–15.9)
IMM GRANULOCYTES # BLD AUTO: 0.01 10*3/MM3 (ref 0–0.05)
IMM GRANULOCYTES NFR BLD AUTO: 0.2 % (ref 0–0.5)
LYMPHOCYTES # BLD AUTO: 1.98 10*3/MM3 (ref 0.7–3.1)
LYMPHOCYTES NFR BLD AUTO: 34.5 % (ref 19.6–45.3)
MCH RBC QN AUTO: 32.3 PG (ref 26.6–33)
MCHC RBC AUTO-ENTMCNC: 34.4 G/DL (ref 31.5–35.7)
MCV RBC AUTO: 94 FL (ref 79–97)
MONOCYTES # BLD AUTO: 0.33 10*3/MM3 (ref 0.1–0.9)
MONOCYTES NFR BLD AUTO: 5.7 % (ref 5–12)
NEUTROPHILS NFR BLD AUTO: 3.24 10*3/MM3 (ref 1.7–7)
NEUTROPHILS NFR BLD AUTO: 56.5 % (ref 42.7–76)
NRBC BLD AUTO-RTO: 0 /100 WBC (ref 0–0.2)
PLATELET # BLD AUTO: 210 10*3/MM3 (ref 140–450)
PMV BLD AUTO: 12.1 FL (ref 6–12)
RBC # BLD AUTO: 3.65 10*6/MM3 (ref 3.77–5.28)
T VAGINALIS DNA VAG QL PROBE+SIG AMP: NEGATIVE
WBC NRBC COR # BLD AUTO: 5.74 10*3/MM3 (ref 3.4–10.8)

## 2024-10-30 PROCEDURE — 87510 GARDNER VAG DNA DIR PROBE: CPT | Performed by: NURSE PRACTITIONER

## 2024-10-30 PROCEDURE — 80061 LIPID PANEL: CPT | Performed by: NURSE PRACTITIONER

## 2024-10-30 PROCEDURE — 87660 TRICHOMONAS VAGIN DIR PROBE: CPT | Performed by: NURSE PRACTITIONER

## 2024-10-30 PROCEDURE — 99213 OFFICE O/P EST LOW 20 MIN: CPT | Performed by: NURSE PRACTITIONER

## 2024-10-30 PROCEDURE — 84443 ASSAY THYROID STIM HORMONE: CPT | Performed by: NURSE PRACTITIONER

## 2024-10-30 PROCEDURE — 84479 ASSAY OF THYROID (T3 OR T4): CPT | Performed by: NURSE PRACTITIONER

## 2024-10-30 PROCEDURE — 85025 COMPLETE CBC W/AUTO DIFF WBC: CPT | Performed by: NURSE PRACTITIONER

## 2024-10-30 PROCEDURE — 36415 COLL VENOUS BLD VENIPUNCTURE: CPT | Performed by: NURSE PRACTITIONER

## 2024-10-30 PROCEDURE — 84436 ASSAY OF TOTAL THYROXINE: CPT | Performed by: NURSE PRACTITIONER

## 2024-10-30 PROCEDURE — 80053 COMPREHEN METABOLIC PANEL: CPT | Performed by: NURSE PRACTITIONER

## 2024-10-30 PROCEDURE — 87480 CANDIDA DNA DIR PROBE: CPT | Performed by: NURSE PRACTITIONER

## 2024-10-30 PROCEDURE — 99396 PREV VISIT EST AGE 40-64: CPT | Performed by: NURSE PRACTITIONER

## 2024-10-30 RX ORDER — LANOLIN ALCOHOL/MO/W.PET/CERES
50 CREAM (GRAM) TOPICAL DAILY
Qty: 90 TABLET | Refills: 1 | Status: SHIPPED | OUTPATIENT
Start: 2024-10-30

## 2024-10-30 RX ORDER — METHOCARBAMOL 500 MG/1
500 TABLET, FILM COATED ORAL 3 TIMES DAILY
Qty: 90 TABLET | Refills: 3 | Status: SHIPPED | OUTPATIENT
Start: 2024-10-30

## 2024-10-30 RX ORDER — AMITRIPTYLINE HYDROCHLORIDE 100 MG/1
100 TABLET ORAL NIGHTLY
Qty: 90 TABLET | Refills: 1 | Status: SHIPPED | OUTPATIENT
Start: 2024-10-30

## 2024-10-30 RX ORDER — LEVOTHYROXINE SODIUM 175 UG/1
175 TABLET ORAL DAILY
Qty: 30 TABLET | Refills: 0 | Status: SHIPPED | OUTPATIENT
Start: 2024-10-30

## 2024-10-30 RX ORDER — ATORVASTATIN CALCIUM 10 MG/1
10 TABLET, FILM COATED ORAL DAILY
Qty: 90 TABLET | Refills: 1 | Status: SHIPPED | OUTPATIENT
Start: 2024-10-30

## 2024-10-30 RX ORDER — GABAPENTIN 300 MG/1
300 CAPSULE ORAL DAILY
Qty: 90 CAPSULE | Refills: 0 | Status: SHIPPED | OUTPATIENT
Start: 2024-10-30 | End: 2024-10-30 | Stop reason: SDUPTHER

## 2024-10-30 RX ORDER — ZOLPIDEM TARTRATE 10 MG/1
10 TABLET ORAL NIGHTLY PRN
Qty: 90 TABLET | Refills: 1 | Status: SHIPPED | OUTPATIENT
Start: 2024-10-30

## 2024-10-30 RX ORDER — ONDANSETRON 4 MG/1
4 TABLET, ORALLY DISINTEGRATING ORAL EVERY 8 HOURS PRN
Qty: 30 TABLET | Refills: 1 | Status: SHIPPED | OUTPATIENT
Start: 2024-10-30

## 2024-10-30 RX ORDER — GABAPENTIN 300 MG/1
300 CAPSULE ORAL DAILY
Qty: 90 CAPSULE | Refills: 1 | Status: SHIPPED | OUTPATIENT
Start: 2024-10-30

## 2024-10-30 RX ORDER — PANTOPRAZOLE SODIUM 40 MG/1
40 TABLET, DELAYED RELEASE ORAL DAILY
Qty: 90 TABLET | Refills: 1 | Status: SHIPPED | OUTPATIENT
Start: 2024-10-30

## 2024-10-30 RX ORDER — CELECOXIB 400 MG/1
400 CAPSULE ORAL DAILY
Qty: 90 CAPSULE | Refills: 1 | Status: SHIPPED | OUTPATIENT
Start: 2024-10-30

## 2024-10-30 NOTE — PROGRESS NOTES
"  ENCOUNTER DATE:  10/30/2024    Wade Loo / 63 y.o. / female      CHIEF COMPLAINT     Annual Exam      VITALS     Visit Vitals  /80   Pulse 83   Temp 97.8 °F (36.6 °C)   Ht 154.9 cm (61\")   Wt 72.6 kg (160 lb)   SpO2 99%   BMI 30.23 kg/m²       BP Readings from Last 3 Encounters:   10/30/24 120/80   03/22/24 130/77   09/15/23 122/76     Wt Readings from Last 3 Encounters:   10/30/24 72.6 kg (160 lb)   03/22/24 73 kg (161 lb)   09/15/23 72.3 kg (159 lb 6.4 oz)      Body mass index is 30.23 kg/m².    MEDICATIONS     Current Outpatient Medications on File Prior to Visit   Medication Sig Dispense Refill    Calcium Carbonate-Vitamin D (calcium-vitamin D) 500-200 MG-UNIT tablet per tablet Take 1 tablet by mouth Daily.      cyanocobalamin 1000 MCG/ML injection Inject 1 mL into the appropriate muscle as directed by prescriber Every 30 (Thirty) Days. 3 mL 0    Diclofenac Sodium (VOLTAREN) 1 % gel gel Apply 4 g topically to the appropriate area as directed 4 (Four) Times a Day As Needed (as needed for pain and swelling.). 100 g 2    estrogens, conjugated, (PREMARIN) 0.625 MG tablet Take 1 tablet by mouth Daily. Take daily for 21 days then do not take for 7 days. 90 tablet 1    Syringe 25G X 1-1/2\" 3 ML misc 1 each Every 30 (Thirty) Days. 100 each 0    [DISCONTINUED] amitriptyline (ELAVIL) 100 MG tablet Take 1 tablet by mouth Every Night. 90 tablet 1    [DISCONTINUED] atorvastatin (LIPITOR) 10 MG tablet Take 1 tablet by mouth Daily. 90 tablet 1    [DISCONTINUED] celecoxib (CeleBREX) 400 MG capsule Take 1 capsule by mouth Daily. 90 capsule 1    [DISCONTINUED] gabapentin (NEURONTIN) 300 MG capsule Take 1 capsule by mouth Daily. 90 capsule 0    [DISCONTINUED] levothyroxine (SYNTHROID, LEVOTHROID) 175 MCG tablet Take 1 tablet by mouth Daily. 30 tablet 0    [DISCONTINUED] methocarbamol (ROBAXIN) 500 MG tablet Take 1 tablet by mouth 3 (Three) Times a Day. 90 tablet 3    [DISCONTINUED] pantoprazole (PROTONIX) 40 MG EC " "tablet Take 1 tablet by mouth Daily. 90 tablet 1    [DISCONTINUED] phentermine (ADIPEX-P) 37.5 MG tablet Take 1 tablet by mouth Every Morning Before Breakfast. 30 tablet 2    [DISCONTINUED] topiramate (TOPAMAX) 25 MG tablet Take 1 tablet by mouth Every Morning. 90 tablet 1    [DISCONTINUED] vitamin B-6 (PYRIDOXINE) 50 MG tablet Take 1 tablet by mouth Daily. 90 tablet 1    [DISCONTINUED] zolpidem (AMBIEN) 10 MG tablet Take 1 tablet by mouth At Night As Needed for Sleep. 90 tablet 1     No current facility-administered medications on file prior to visit.         HISTORY OF PRESENT ILLNESS     Wade presents for annual health maintenance visit.  No results found for: \"HPVAPTIMA\"   Last health maintenance visit: approximately 1 year ago  General health: good  Lifestyle:  Attempting to lose weight?: Yes   Diet: eats a well balanced, healthy diet  Exercise: generally stays active (but no regular exercise)  Tobacco: Never used   Alcohol: does not drink  Work: Full-time  Reproductive health:  Sexually active?: Yes   Sexual problems?:  vaginal odor after menopause  Concern for STD?: Yes    Sees Gynecologist?: No   Sindhu/Postmenopausal?: Yes   Domestic abuse concerns: No   Depression Screening:          PHQ-9 Depression Screening  Little interest or pleasure in doing things?     Feeling down, depressed, or hopeless?     PHQ-2 Total Score     Trouble falling or staying asleep, or sleeping too much?     Feeling tired or having little energy?     Poor appetite or overeating?     Feeling bad about yourself - or that you are a failure or have let yourself or your family down?     Trouble concentrating on things, such as reading the newspaper or watching television?     Moving or speaking so slowly that other people could have noticed? Or the opposite - being so fidgety or restless that you have been moving around a lot more than usual?     Thoughts that you would be better off dead, or of hurting yourself in some way?     PHQ-9 " Total Score     If you checked off any problems, how difficult have these problems made it for you to do your work, take care of things at home, or get along with other people?         CEDRIC-7           Patient Care Team:  aMriel Craig APRN as PCP - General (Nurse Practitioner)      ALLERGIES  Allergies   Allergen Reactions    Zoloft [Sertraline Hcl] Hives        PFSH:     The following portions of the patient's history were reviewed and updated as appropriate: Allergies / Current Medications / Past Medical History / Surgical History / Social History / Family History    PROBLEM LIST   Patient Active Problem List   Diagnosis    History of bariatric surgery       PAST MEDICAL HISTORY  Past Medical History:   Diagnosis Date    Broken bones     Condition not found     Thyroid problems     Depression     Gallstone     Hemorrhoid     Hernia cerebri     Hyperlipidemia     Night sweats     Sinus trouble        SURGICAL HISTORY  Past Surgical History:   Procedure Laterality Date    BARIATRIC SURGERY       SECTION  1983    CHOLECYSTECTOMY      GASTRIC BYPASS      HYSTERECTOMY      OTHER SURGICAL HISTORY      Barbara Gibbs       SOCIAL HISTORY  Social History     Socioeconomic History    Marital status:    Tobacco Use    Smoking status: Former     Current packs/day: 0.00     Average packs/day: 1 pack/day for 23.0 years (23.0 ttl pk-yrs)     Types: Cigarettes     Start date: 3/2/1982     Quit date: 2000     Years since quittin.5    Smokeless tobacco: Never   Vaping Use    Vaping status: Never Used   Substance and Sexual Activity    Alcohol use: Never    Drug use: Never    Sexual activity: Yes     Partners: Male     Birth control/protection: Hysterectomy     Comment: Historectomy       FAMILY HISTORY  Family History   Problem Relation Age of Onset    Stroke Mother     Diabetes Mother     Stroke Father     Heart disease Father     Stroke Other     Heart attack  Other     Diabetes Other     Heart attack Other     Diabetes Daughter        IMMUNIZATION HISTORY  Immunization History   Administered Date(s) Administered    COVID-19 (PFIZER) 12YRS+ (COMIRNATY) 10/12/2024    COVID-19 (PFIZER) BIVALENT 12+YRS 09/15/2022    COVID-19 (PFIZER) Purple Cap Monovalent 03/02/2021, 03/29/2021, 12/08/2021    Fluzone (or Fluarix & Flulaval for VFC) >6mos 10/14/2021    Influenza, Unspecified 10/01/2020    Shingrix 03/02/2022, 09/15/2022         HPI    History of Present Illness  The patient is a 64-year-old female who presents for an annual physical exam.    She reports not a significant weight loss and is currently on phentermine, which she feels is not effective. She administers B12 injections herself and maintains a balanced diet. She is physically active at work and experiences leg pain. She is also taking gabapentin for nerve pain, Ambien, and amitriptyline for restless leg syndrome. She reports no swelling in her feet or legs.    She has a history of hysterectomy in 2004 and received her influenza and COVID-19 vaccines approximately 2 to 3 weeks ago.    She reports an unusual odor after sexual intercourse with her , which she did not notice when she was younger. She uses a lubricant during intercourse and has noticed a white discharge with an unusual smell on her underwear.    SOCIAL HISTORY  She does not smoke. She drinks alcohol.     Hypothyroidism  This is a chronic problem. The current episode started more than 1 year ago. The problem occurs rarely. Associated symptoms include arthralgias, joint swelling and myalgias and fatigue. Patient states that she is feeling more sluggish and would like for her thyroid levels to be checked. Pertinent negatives include no abdominal pain or sore throat. Nothing aggravates the symptoms. Treatments tried: levothyroxin. The treatment provided significant relief.   Vitamin B12 deficiency  Chronic condition, patient has had the condition for  over a year.  Condition is stable, patient is currently taking vitamin B12 injections once a month.  Associated symptoms initially were fatigue, dry skin, and myalgia.  Patient is doing well on vitamin supplements and is requesting refill today.  Vitamin D deficiency  This is a chronic problem.  Current episode has been for longer than 6 months.  Problem has gradually been improving since he has been on vitamin D supplements.  He denies any excessive fatigue, hair loss, skin changes due to the vitamin D deficiency.  He tries to eat a well-balanced diet.  He has been on the vitamin D weekly, no compliance problems.  Condition is stable    Physical Exam  Vitals reviewed.   Constitutional:       General: She is not in acute distress.     Appearance: Normal appearance. She is well-developed. She is obese. She is not ill-appearing.   HENT:      Head: Normocephalic and atraumatic.   Eyes:      Conjunctiva/sclera: Conjunctivae normal.      Pupils: Pupils are equal, round, and reactive to light.   Cardiovascular:      Rate and Rhythm: Normal rate and regular rhythm.      Heart sounds: No murmur heard.  Pulmonary:      Effort: Pulmonary effort is normal.      Breath sounds: Normal breath sounds. No wheezing.   Musculoskeletal:      Right lower leg: No edema.      Left lower leg: No edema.   Skin:     General: Skin is warm and dry.   Neurological:      Mental Status: She is alert and oriented to person, place, and time.   Psychiatric:         Mood and Affect: Mood and affect normal.         Behavior: Behavior normal.         Thought Content: Thought content normal.         Judgment: Judgment normal.         REVIEWED DATA      Labs:    Lab Results   Component Value Date     04/05/2024    K 3.9 04/05/2024    CALCIUM 8.5 (L) 04/05/2024    AST 20 03/22/2024    ALT 21 03/22/2024    BUN 16 04/05/2024    CREATININE 0.86 04/05/2024    CREATININE 0.78 03/22/2024    CREATININE 0.70 08/24/2023    EGFRIFNONA 61 02/25/2022       Lab  "Results   Component Value Date    HGBA1C 5.20 08/24/2023    TSH 0.751 03/22/2024    FREET4 1.8 04/02/2021       Lab Results   Component Value Date    LDL 66 03/22/2024    HDL 99 (H) 03/22/2024    TRIG 66 03/22/2024    CHOLHDLRATIO 2.7 (L) 02/25/2021       Lab Results   Component Value Date    FKXC45MX 34.0 03/22/2024        Lab Results   Component Value Date    WBC 6.11 03/22/2024    HGB 12.2 03/22/2024    MCV 92.0 03/22/2024     03/22/2024       No results found for: \"PROTEIN\", \"GLUCOSEU\", \"BLOODU\", \"NITRITEU\", \"LEUKOCYTESUR\"       Lab Results   Component Value Date    HEPCVIRUSABY Non-Reactive 08/11/2022           ASSESSMENT & PLAN     Diagnoses and all orders for this visit:    1. Annual physical exam (Primary)    2. Class 1 obesity due to excess calories with serious comorbidity and body mass index (BMI) of 30.0 to 30.9 in adult  -     Semaglutide-Weight Management 0.25 MG/0.5ML solution auto-injector; Inject 0.5 mL under the skin into the appropriate area as directed 1 (One) Time Per Week. Month 1 starting dose  Dispense: 2 mL; Refill: 0  -     Semaglutide-Weight Management 0.5 MG/0.5ML solution auto-injector; Inject 0.5 mL under the skin into the appropriate area as directed 1 (One) Time Per Week. Month 2 continuation dose  Dispense: 2 mL; Refill: 1  -     Discontinue: gabapentin (NEURONTIN) 300 MG capsule; Take 1 capsule by mouth Daily.  Dispense: 90 capsule; Refill: 0  -     gabapentin (NEURONTIN) 300 MG capsule; Take 1 capsule by mouth Daily.  Dispense: 90 capsule; Refill: 1    3. Vitamin B12 deficiency    4. Vitamin D deficiency    5. Acquired hypothyroidism  -     Thyroid Panel With TSH    6. Vaginal odor  -     Gardnerella vaginalis, Trichomonas vaginalis, Candida albicans, DNA - Swab, Vagina    7. Mixed hyperlipidemia  -     Comprehensive Metabolic Panel  -     CBC & Differential  -     Lipid Panel    8. Encounter for screening mammogram for malignant neoplasm of breast  -     Mammo Screening " Digital Tomosynthesis Bilateral With CAD; Future    9. Insomnia, unspecified type  -     zolpidem (AMBIEN) 10 MG tablet; Take 1 tablet by mouth At Night As Needed for Sleep.  Dispense: 90 tablet; Refill: 1    Other orders  -     ondansetron ODT (ZOFRAN-ODT) 4 MG disintegrating tablet; Place 1 tablet on the tongue Every 8 (Eight) Hours As Needed for Nausea or Vomiting.  Dispense: 30 tablet; Refill: 1  -     atorvastatin (LIPITOR) 10 MG tablet; Take 1 tablet by mouth Daily.  Dispense: 90 tablet; Refill: 1  -     amitriptyline (ELAVIL) 100 MG tablet; Take 1 tablet by mouth Every Night.  Dispense: 90 tablet; Refill: 1  -     levothyroxine (SYNTHROID, LEVOTHROID) 175 MCG tablet; Take 1 tablet by mouth Daily.  Dispense: 30 tablet; Refill: 0  -     pantoprazole (PROTONIX) 40 MG EC tablet; Take 1 tablet by mouth Daily.  Dispense: 90 tablet; Refill: 1  -     celecoxib (CeleBREX) 400 MG capsule; Take 1 capsule by mouth Daily.  Dispense: 90 capsule; Refill: 1  -     vitamin B-6 (PYRIDOXINE) 50 MG tablet; Take 1 tablet by mouth Daily.  Dispense: 90 tablet; Refill: 1  -     methocarbamol (ROBAXIN) 500 MG tablet; Take 1 tablet by mouth 3 (Three) Times a Day.  Dispense: 90 tablet; Refill: 3        Assessment & Plan  1. Annual physical exam.  A prescription for thyroid medication was provided. The potential benefits and side effects of injectable medications for weight loss were discussed. She was informed that the injectables are initially used for diabetes but have a significant weight loss effect. Common side effects such as heartburn, nausea, constipation, and diarrhea were explained. Instructions on how to use the injectables, including dosage and administration, were provided. A prescription for nausea medication was also given. All other medications, including gabapentin, Ambien, and amitriptyline, were refilled and sent to [place] [facility].    2. Suspected bacterial vaginosis.  She reported a weird smell after intercourse  and white discharge in her underwear. A vaginal swab was recommended to confirm the diagnosis and check for bacterial vaginosis or yeast infection. She was advised to consider changing the lubricant she uses.    3. Health Maintenance.  A mammogram was ordered as she is due for it. She received a flu shot and a COVID-19 vaccine about two to three weeks ago at [facility].           HEALTHCARE MAINTENANCE ISSUES     Cancer Screening:  Colon: Initial/Next screening at age: CURRENT  Repeat colon cancer screening: every 3 years  Breast: Recommended monthly self exams; annual professional exam  Mammogram: every 1 year  Cervical: N/A s/p total hysterectomy  Skin: Monthly self skin examination, annual exam by health professional      Lifestyle Counseling:  Lifestyle Modifications: Attempt to lose weight, Continue good lifestyle choices/modifications, and Improve dietary compliance  Safety Issues: Always wear seatbelt, Avoid texting while driving   Use sunscreen, regular skin examination  Recommended annual dental/vision examination.  Emotional/Stress/Sleep: Reviewed and  given when appropriate    Part of this note may be electronic transcription/translation of spoken language to printed text using the Dragon dictation system      Patient or patient representative verbalized consent for the use of Ambient Listening during the visit with  ARLEEN Tierney for chart documentation. 10/30/2024  15:47 EDT

## 2024-10-31 LAB
ALBUMIN SERPL-MCNC: 4 G/DL (ref 3.5–5.2)
ALBUMIN/GLOB SERPL: 1.4 G/DL
ALP SERPL-CCNC: 66 U/L (ref 39–117)
ALT SERPL W P-5'-P-CCNC: 26 U/L (ref 1–33)
ANION GAP SERPL CALCULATED.3IONS-SCNC: 11.1 MMOL/L (ref 5–15)
AST SERPL-CCNC: 27 U/L (ref 1–32)
BILIRUB SERPL-MCNC: 0.2 MG/DL (ref 0–1.2)
BUN SERPL-MCNC: 17 MG/DL (ref 8–23)
BUN/CREAT SERPL: 19.8 (ref 7–25)
CALCIUM SPEC-SCNC: 9.3 MG/DL (ref 8.6–10.5)
CHLORIDE SERPL-SCNC: 101 MMOL/L (ref 98–107)
CHOLEST SERPL-MCNC: 177 MG/DL (ref 0–200)
CO2 SERPL-SCNC: 21.9 MMOL/L (ref 22–29)
CREAT SERPL-MCNC: 0.86 MG/DL (ref 0.57–1)
EGFRCR SERPLBLD CKD-EPI 2021: 76.5 ML/MIN/1.73
GLOBULIN UR ELPH-MCNC: 2.8 GM/DL
GLUCOSE SERPL-MCNC: 88 MG/DL (ref 65–99)
HDLC SERPL-MCNC: 92 MG/DL (ref 40–60)
LDLC SERPL CALC-MCNC: 71 MG/DL (ref 0–100)
LDLC/HDLC SERPL: 0.76 {RATIO}
POTASSIUM SERPL-SCNC: 4.4 MMOL/L (ref 3.5–5.2)
PROT SERPL-MCNC: 6.8 G/DL (ref 6–8.5)
SODIUM SERPL-SCNC: 134 MMOL/L (ref 136–145)
T-UPTAKE NFR SERPL: 1.14 TBI (ref 0.8–1.3)
T4 SERPL-MCNC: 10.8 MCG/DL (ref 4.5–11.7)
TRIGL SERPL-MCNC: 77 MG/DL (ref 0–150)
TSH SERPL DL<=0.05 MIU/L-ACNC: 9.45 UIU/ML (ref 0.27–4.2)
VLDLC SERPL-MCNC: 14 MG/DL (ref 5–40)

## 2024-11-11 DIAGNOSIS — E03.9 ACQUIRED HYPOTHYROIDISM: Primary | ICD-10-CM

## 2024-11-11 RX ORDER — METRONIDAZOLE 7.5 MG/G
1 GEL VAGINAL NIGHTLY
Qty: 1 EACH | Refills: 0 | Status: SHIPPED | OUTPATIENT
Start: 2024-11-11 | End: 2024-11-18

## 2024-11-18 ENCOUNTER — PRIOR AUTHORIZATION (OUTPATIENT)
Dept: FAMILY MEDICINE CLINIC | Facility: CLINIC | Age: 64
End: 2024-11-18
Payer: OTHER GOVERNMENT

## 2024-11-18 NOTE — TELEPHONE ENCOUNTER
PA Rx Created for Wegovy 0.25MG/0.5ML auto-injectors - Wade Loo ([place]: [id number]) - [id number]  Wegovy 0.25MG/0.5ML auto-injectors  status: PA RequestCreated: November 18th, 2024Sent: November 18th, 2024

## 2024-11-19 ENCOUNTER — PRIOR AUTHORIZATION (OUTPATIENT)
Dept: FAMILY MEDICINE CLINIC | Facility: CLINIC | Age: 64
End: 2024-11-19
Payer: OTHER GOVERNMENT

## 2024-11-19 NOTE — TELEPHONE ENCOUNTER
WEGOVY PA- An active PA is already on file with expiration date of 11/19/2025. Please wait to resubmit request within 60 days of that expiration date to obtain a PA renewal.

## 2024-12-10 ENCOUNTER — CLINICAL SUPPORT (OUTPATIENT)
Dept: FAMILY MEDICINE CLINIC | Facility: CLINIC | Age: 64
End: 2024-12-10
Payer: OTHER GOVERNMENT

## 2024-12-10 DIAGNOSIS — E03.9 ACQUIRED HYPOTHYROIDISM: ICD-10-CM

## 2024-12-10 LAB
T-UPTAKE NFR SERPL: 1.06 TBI (ref 0.8–1.3)
T4 SERPL-MCNC: 12.1 MCG/DL (ref 4.5–11.7)
TSH SERPL DL<=0.05 MIU/L-ACNC: 1 UIU/ML (ref 0.27–4.2)

## 2024-12-10 PROCEDURE — 84479 ASSAY OF THYROID (T3 OR T4): CPT | Performed by: NURSE PRACTITIONER

## 2024-12-10 PROCEDURE — 84436 ASSAY OF TOTAL THYROXINE: CPT | Performed by: NURSE PRACTITIONER

## 2024-12-10 PROCEDURE — 84443 ASSAY THYROID STIM HORMONE: CPT | Performed by: NURSE PRACTITIONER

## 2024-12-10 PROCEDURE — 36415 COLL VENOUS BLD VENIPUNCTURE: CPT | Performed by: NURSE PRACTITIONER

## 2025-01-28 DIAGNOSIS — E53.8 VITAMIN B12 DEFICIENCY: ICD-10-CM

## 2025-01-28 DIAGNOSIS — E66.811 CLASS 1 OBESITY DUE TO EXCESS CALORIES WITH SERIOUS COMORBIDITY AND BODY MASS INDEX (BMI) OF 30.0 TO 30.9 IN ADULT: ICD-10-CM

## 2025-01-28 DIAGNOSIS — E66.09 CLASS 1 OBESITY DUE TO EXCESS CALORIES WITH SERIOUS COMORBIDITY AND BODY MASS INDEX (BMI) OF 30.0 TO 30.9 IN ADULT: ICD-10-CM

## 2025-01-28 RX ORDER — LANOLIN ALCOHOL/MO/W.PET/CERES
50 CREAM (GRAM) TOPICAL DAILY
Qty: 90 TABLET | Refills: 1 | Status: CANCELLED | OUTPATIENT
Start: 2025-01-28

## 2025-01-28 RX ORDER — PANTOPRAZOLE SODIUM 40 MG/1
40 TABLET, DELAYED RELEASE ORAL DAILY
Qty: 90 TABLET | Refills: 1 | Status: CANCELLED | OUTPATIENT
Start: 2025-01-28

## 2025-01-28 RX ORDER — CELECOXIB 400 MG/1
400 CAPSULE ORAL DAILY
Qty: 90 CAPSULE | Refills: 1 | Status: CANCELLED | OUTPATIENT
Start: 2025-01-28

## 2025-01-28 RX ORDER — METHOCARBAMOL 500 MG/1
500 TABLET, FILM COATED ORAL 3 TIMES DAILY
Qty: 90 TABLET | Refills: 3 | Status: CANCELLED | OUTPATIENT
Start: 2025-01-28

## 2025-01-28 RX ORDER — AMITRIPTYLINE HYDROCHLORIDE 100 MG/1
100 TABLET ORAL NIGHTLY
Qty: 90 TABLET | Refills: 1 | Status: CANCELLED | OUTPATIENT
Start: 2025-01-28

## 2025-01-28 RX ORDER — LEVOTHYROXINE SODIUM 175 UG/1
175 TABLET ORAL DAILY
Qty: 30 TABLET | Refills: 0 | Status: CANCELLED | OUTPATIENT
Start: 2025-01-28

## 2025-01-29 DIAGNOSIS — E66.811 CLASS 1 OBESITY DUE TO EXCESS CALORIES WITH SERIOUS COMORBIDITY AND BODY MASS INDEX (BMI) OF 30.0 TO 30.9 IN ADULT: ICD-10-CM

## 2025-01-29 DIAGNOSIS — E66.09 CLASS 1 OBESITY DUE TO EXCESS CALORIES WITH SERIOUS COMORBIDITY AND BODY MASS INDEX (BMI) OF 30.0 TO 30.9 IN ADULT: ICD-10-CM

## 2025-01-29 RX ORDER — CELECOXIB 400 MG/1
400 CAPSULE ORAL DAILY
Qty: 90 CAPSULE | Refills: 0 | Status: SHIPPED | OUTPATIENT
Start: 2025-01-29

## 2025-01-29 RX ORDER — ONDANSETRON 4 MG/1
4 TABLET, ORALLY DISINTEGRATING ORAL EVERY 8 HOURS PRN
Qty: 30 TABLET | Refills: 1 | Status: SHIPPED | OUTPATIENT
Start: 2025-01-29

## 2025-01-29 RX ORDER — LEVOTHYROXINE SODIUM 175 UG/1
175 TABLET ORAL DAILY
Qty: 30 TABLET | Refills: 0 | Status: SHIPPED | OUTPATIENT
Start: 2025-01-29

## 2025-01-29 RX ORDER — LANOLIN ALCOHOL/MO/W.PET/CERES
50 CREAM (GRAM) TOPICAL DAILY
Qty: 90 TABLET | Refills: 0 | Status: SHIPPED | OUTPATIENT
Start: 2025-01-29

## 2025-01-29 RX ORDER — GABAPENTIN 300 MG/1
300 CAPSULE ORAL DAILY
Qty: 90 CAPSULE | Refills: 1 | Status: SHIPPED | OUTPATIENT
Start: 2025-01-29

## 2025-01-29 RX ORDER — PANTOPRAZOLE SODIUM 40 MG/1
40 TABLET, DELAYED RELEASE ORAL DAILY
Qty: 90 TABLET | Refills: 0 | Status: SHIPPED | OUTPATIENT
Start: 2025-01-29

## 2025-01-29 RX ORDER — METHOCARBAMOL 500 MG/1
500 TABLET, FILM COATED ORAL 3 TIMES DAILY
Qty: 90 TABLET | Refills: 0 | Status: SHIPPED | OUTPATIENT
Start: 2025-01-29

## 2025-01-29 RX ORDER — CYANOCOBALAMIN 1000 UG/ML
1000 INJECTION, SOLUTION INTRAMUSCULAR; SUBCUTANEOUS
Qty: 3 ML | Refills: 0 | Status: SHIPPED | OUTPATIENT
Start: 2025-01-29

## 2025-01-29 RX ORDER — AMITRIPTYLINE HYDROCHLORIDE 100 MG/1
100 TABLET ORAL NIGHTLY
Qty: 90 TABLET | Refills: 0 | Status: SHIPPED | OUTPATIENT
Start: 2025-01-29

## 2025-02-13 ENCOUNTER — HOSPITAL ENCOUNTER (OUTPATIENT)
Dept: MAMMOGRAPHY | Facility: HOSPITAL | Age: 65
Discharge: HOME | End: 2025-02-13
Admitting: NURSE PRACTITIONER
Payer: OTHER GOVERNMENT

## 2025-02-13 DIAGNOSIS — Z12.31 ENCOUNTER FOR SCREENING MAMMOGRAM FOR MALIGNANT NEOPLASM OF BREAST: ICD-10-CM

## 2025-02-13 PROCEDURE — 77063 BREAST TOMOSYNTHESIS BI: CPT

## 2025-02-13 PROCEDURE — 77067 SCR MAMMO BI INCL CAD: CPT

## 2025-03-05 DIAGNOSIS — Z12.11 SCREENING FOR COLON CANCER: Primary | ICD-10-CM

## 2025-03-28 DIAGNOSIS — E66.09 CLASS 1 OBESITY DUE TO EXCESS CALORIES WITH SERIOUS COMORBIDITY AND BODY MASS INDEX (BMI) OF 30.0 TO 30.9 IN ADULT: ICD-10-CM

## 2025-03-28 DIAGNOSIS — E66.811 CLASS 1 OBESITY DUE TO EXCESS CALORIES WITH SERIOUS COMORBIDITY AND BODY MASS INDEX (BMI) OF 30.0 TO 30.9 IN ADULT: ICD-10-CM

## 2025-03-31 RX ORDER — LEVOTHYROXINE SODIUM 175 UG/1
175 TABLET ORAL DAILY
Qty: 30 TABLET | Refills: 0 | Status: SHIPPED | OUTPATIENT
Start: 2025-03-31

## 2025-04-30 ENCOUNTER — OFFICE VISIT (OUTPATIENT)
Dept: FAMILY MEDICINE CLINIC | Facility: CLINIC | Age: 65
End: 2025-04-30
Payer: OTHER GOVERNMENT

## 2025-04-30 VITALS
DIASTOLIC BLOOD PRESSURE: 70 MMHG | HEART RATE: 79 BPM | SYSTOLIC BLOOD PRESSURE: 110 MMHG | BODY MASS INDEX: 28.94 KG/M2 | HEIGHT: 61 IN | WEIGHT: 153.3 LBS | TEMPERATURE: 97.9 F | OXYGEN SATURATION: 100 %

## 2025-04-30 DIAGNOSIS — E78.2 MIXED HYPERLIPIDEMIA: ICD-10-CM

## 2025-04-30 DIAGNOSIS — N95.1 POST MENOPAUSAL SYNDROME: ICD-10-CM

## 2025-04-30 DIAGNOSIS — R73.01 IFG (IMPAIRED FASTING GLUCOSE): ICD-10-CM

## 2025-04-30 DIAGNOSIS — E66.3 OVERWEIGHT WITH BODY MASS INDEX (BMI) OF 28 TO 28.9 IN ADULT: ICD-10-CM

## 2025-04-30 DIAGNOSIS — E03.9 ACQUIRED HYPOTHYROIDISM: ICD-10-CM

## 2025-04-30 DIAGNOSIS — E55.9 VITAMIN D DEFICIENCY: ICD-10-CM

## 2025-04-30 DIAGNOSIS — Z09 FOLLOW-UP EXAM: Primary | ICD-10-CM

## 2025-04-30 DIAGNOSIS — Z23 NEED FOR VACCINATION: ICD-10-CM

## 2025-04-30 DIAGNOSIS — E66.811 CLASS 1 OBESITY DUE TO EXCESS CALORIES WITH SERIOUS COMORBIDITY AND BODY MASS INDEX (BMI) OF 30.0 TO 30.9 IN ADULT: ICD-10-CM

## 2025-04-30 DIAGNOSIS — G47.00 INSOMNIA, UNSPECIFIED TYPE: ICD-10-CM

## 2025-04-30 DIAGNOSIS — M25.50 POLYARTHRALGIA: ICD-10-CM

## 2025-04-30 DIAGNOSIS — E53.8 VITAMIN B12 DEFICIENCY: ICD-10-CM

## 2025-04-30 DIAGNOSIS — E66.09 CLASS 1 OBESITY DUE TO EXCESS CALORIES WITH SERIOUS COMORBIDITY AND BODY MASS INDEX (BMI) OF 30.0 TO 30.9 IN ADULT: ICD-10-CM

## 2025-04-30 LAB
25(OH)D3 SERPL-MCNC: 35.1 NG/ML (ref 30–100)
ALBUMIN SERPL-MCNC: 4 G/DL (ref 3.5–5.2)
ALBUMIN/GLOB SERPL: 1.6 G/DL
ALP SERPL-CCNC: 57 U/L (ref 39–117)
ALT SERPL W P-5'-P-CCNC: 15 U/L (ref 1–33)
ANION GAP SERPL CALCULATED.3IONS-SCNC: 10 MMOL/L (ref 5–15)
AST SERPL-CCNC: 17 U/L (ref 1–32)
BASOPHILS # BLD AUTO: 0.05 10*3/MM3 (ref 0–0.2)
BASOPHILS NFR BLD AUTO: 1.4 % (ref 0–1.5)
BILIRUB SERPL-MCNC: 0.2 MG/DL (ref 0–1.2)
BUN SERPL-MCNC: 18 MG/DL (ref 8–23)
BUN/CREAT SERPL: 20.5 (ref 7–25)
CALCIUM SPEC-SCNC: 8.8 MG/DL (ref 8.6–10.5)
CHLORIDE SERPL-SCNC: 106 MMOL/L (ref 98–107)
CHOLEST SERPL-MCNC: 140 MG/DL (ref 0–200)
CO2 SERPL-SCNC: 23 MMOL/L (ref 22–29)
CREAT SERPL-MCNC: 0.88 MG/DL (ref 0.57–1)
DEPRECATED RDW RBC AUTO: 42.1 FL (ref 37–54)
EGFRCR SERPLBLD CKD-EPI 2021: 73.9 ML/MIN/1.73
EOSINOPHIL # BLD AUTO: 0.16 10*3/MM3 (ref 0–0.4)
EOSINOPHIL NFR BLD AUTO: 4.5 % (ref 0.3–6.2)
ERYTHROCYTE [DISTWIDTH] IN BLOOD BY AUTOMATED COUNT: 12.2 % (ref 12.3–15.4)
FOLATE SERPL-MCNC: 16.9 NG/ML (ref 4.78–24.2)
GLOBULIN UR ELPH-MCNC: 2.5 GM/DL
GLUCOSE SERPL-MCNC: 71 MG/DL (ref 65–99)
HCT VFR BLD AUTO: 34.2 % (ref 34–46.6)
HDLC SERPL-MCNC: 82 MG/DL (ref 40–60)
HGB BLD-MCNC: 11.8 G/DL (ref 12–15.9)
IMM GRANULOCYTES # BLD AUTO: 0.01 10*3/MM3 (ref 0–0.05)
IMM GRANULOCYTES NFR BLD AUTO: 0.3 % (ref 0–0.5)
LDLC SERPL CALC-MCNC: 48 MG/DL (ref 0–100)
LDLC/HDLC SERPL: 0.61 {RATIO}
LYMPHOCYTES # BLD AUTO: 1.39 10*3/MM3 (ref 0.7–3.1)
LYMPHOCYTES NFR BLD AUTO: 38.8 % (ref 19.6–45.3)
MCH RBC QN AUTO: 32.3 PG (ref 26.6–33)
MCHC RBC AUTO-ENTMCNC: 34.5 G/DL (ref 31.5–35.7)
MCV RBC AUTO: 93.7 FL (ref 79–97)
MONOCYTES # BLD AUTO: 0.35 10*3/MM3 (ref 0.1–0.9)
MONOCYTES NFR BLD AUTO: 9.8 % (ref 5–12)
NEUTROPHILS NFR BLD AUTO: 1.62 10*3/MM3 (ref 1.7–7)
NEUTROPHILS NFR BLD AUTO: 45.2 % (ref 42.7–76)
NRBC BLD AUTO-RTO: 0 /100 WBC (ref 0–0.2)
PLATELET # BLD AUTO: 197 10*3/MM3 (ref 140–450)
PMV BLD AUTO: 11.5 FL (ref 6–12)
POTASSIUM SERPL-SCNC: 4.1 MMOL/L (ref 3.5–5.2)
PROT SERPL-MCNC: 6.5 G/DL (ref 6–8.5)
RBC # BLD AUTO: 3.65 10*6/MM3 (ref 3.77–5.28)
SODIUM SERPL-SCNC: 139 MMOL/L (ref 136–145)
T-UPTAKE NFR SERPL: 0.99 TBI (ref 0.8–1.3)
T4 SERPL-MCNC: 10.4 MCG/DL (ref 4.5–11.7)
TRIGL SERPL-MCNC: 40 MG/DL (ref 0–150)
TSH SERPL DL<=0.05 MIU/L-ACNC: 0.71 UIU/ML (ref 0.27–4.2)
VIT B12 BLD-MCNC: 328 PG/ML (ref 211–946)
VLDLC SERPL-MCNC: 10 MG/DL (ref 5–40)
WBC NRBC COR # BLD AUTO: 3.58 10*3/MM3 (ref 3.4–10.8)

## 2025-04-30 PROCEDURE — 82306 VITAMIN D 25 HYDROXY: CPT | Performed by: NURSE PRACTITIONER

## 2025-04-30 PROCEDURE — 84479 ASSAY OF THYROID (T3 OR T4): CPT | Performed by: NURSE PRACTITIONER

## 2025-04-30 PROCEDURE — 80061 LIPID PANEL: CPT | Performed by: NURSE PRACTITIONER

## 2025-04-30 PROCEDURE — 82746 ASSAY OF FOLIC ACID SERUM: CPT | Performed by: NURSE PRACTITIONER

## 2025-04-30 PROCEDURE — 80053 COMPREHEN METABOLIC PANEL: CPT | Performed by: NURSE PRACTITIONER

## 2025-04-30 PROCEDURE — 82607 VITAMIN B-12: CPT | Performed by: NURSE PRACTITIONER

## 2025-04-30 PROCEDURE — 84443 ASSAY THYROID STIM HORMONE: CPT | Performed by: NURSE PRACTITIONER

## 2025-04-30 PROCEDURE — 84436 ASSAY OF TOTAL THYROXINE: CPT | Performed by: NURSE PRACTITIONER

## 2025-04-30 PROCEDURE — 85025 COMPLETE CBC W/AUTO DIFF WBC: CPT | Performed by: NURSE PRACTITIONER

## 2025-04-30 RX ORDER — ATORVASTATIN CALCIUM 10 MG/1
10 TABLET, FILM COATED ORAL DAILY
Qty: 90 TABLET | Refills: 1 | Status: SHIPPED | OUTPATIENT
Start: 2025-04-30

## 2025-04-30 RX ORDER — PANTOPRAZOLE SODIUM 40 MG/1
40 TABLET, DELAYED RELEASE ORAL DAILY
Qty: 90 TABLET | Refills: 1 | Status: SHIPPED | OUTPATIENT
Start: 2025-04-30

## 2025-04-30 RX ORDER — AMITRIPTYLINE HYDROCHLORIDE 100 MG/1
100 TABLET ORAL NIGHTLY
Qty: 90 TABLET | Refills: 1 | Status: SHIPPED | OUTPATIENT
Start: 2025-04-30

## 2025-04-30 RX ORDER — LANOLIN ALCOHOL/MO/W.PET/CERES
50 CREAM (GRAM) TOPICAL DAILY
Qty: 90 TABLET | Refills: 0 | Status: SHIPPED | OUTPATIENT
Start: 2025-04-30

## 2025-04-30 RX ORDER — METHOCARBAMOL 500 MG/1
500 TABLET, FILM COATED ORAL 3 TIMES DAILY
Qty: 90 TABLET | Refills: 0 | Status: SHIPPED | OUTPATIENT
Start: 2025-04-30

## 2025-04-30 RX ORDER — LEVOTHYROXINE SODIUM 175 UG/1
175 TABLET ORAL DAILY
Qty: 90 TABLET | Refills: 1 | Status: SHIPPED | OUTPATIENT
Start: 2025-04-30

## 2025-04-30 RX ORDER — CYANOCOBALAMIN 1000 UG/ML
1000 INJECTION, SOLUTION INTRAMUSCULAR; SUBCUTANEOUS
Qty: 3 ML | Refills: 0 | Status: SHIPPED | OUTPATIENT
Start: 2025-04-30

## 2025-04-30 RX ORDER — ZOLPIDEM TARTRATE 10 MG/1
10 TABLET ORAL NIGHTLY PRN
Qty: 90 TABLET | Refills: 1 | Status: SHIPPED | OUTPATIENT
Start: 2025-04-30

## 2025-04-30 RX ORDER — ONDANSETRON 4 MG/1
4 TABLET, ORALLY DISINTEGRATING ORAL EVERY 8 HOURS PRN
Qty: 30 TABLET | Refills: 1 | Status: SHIPPED | OUTPATIENT
Start: 2025-04-30

## 2025-04-30 RX ORDER — CELECOXIB 400 MG/1
400 CAPSULE ORAL DAILY
Qty: 90 CAPSULE | Refills: 1 | Status: SHIPPED | OUTPATIENT
Start: 2025-04-30

## 2025-04-30 RX ORDER — GABAPENTIN 300 MG/1
300 CAPSULE ORAL DAILY
Qty: 90 CAPSULE | Refills: 1 | Status: SHIPPED | OUTPATIENT
Start: 2025-04-30

## 2025-04-30 NOTE — PROGRESS NOTES
Chief Complaint  Hypothyroidism, Med Refill, and Weight Check    Subjective        Medical History: has a past medical history of Broken bones (), Condition not found, Depression, Gallstone (), Hemorrhoid, Hernia cerebri (), Hyperlipidemia, Night sweats, and Sinus trouble.     Surgical History: has a past surgical history that includes  section (1983); Gastric bypass (); Hysterectomy (); Other surgical history (); Bariatric Surgery (); and Cholecystectomy ().     Family History: family history includes Diabetes in her daughter, daughter, mother, and another family member; Heart attack in some other family members; Heart disease in her father; Stroke in her father, mother, and another family member.     Social History: reports that she quit smoking about 25 years ago. Her smoking use included cigarettes. She started smoking about 43 years ago. She has a 23 pack-year smoking history. She has never used smokeless tobacco. She reports that she does not drink alcohol and does not use drugs.    Wade Loo presents to [facility]    Hypothyroidism    Weight Management  Weight:  Unchanged  Weight loss treatment:  Portion control, increasing exercise and prescription medications  Treatment barriers:  None  Physical activity tolerance:  Stable  Energy level:  Unchanged    Hypothyroidism  This is a chronic problem. The current episode started more than 1 year ago. The problem occurs rarely. Associated symptoms include arthralgias, joint swelling and myalgias and fatigue. Patient states that she is feeling more sluggish and would like for her thyroid levels to be checked. Pertinent negatives include no abdominal pain or sore throat. Nothing aggravates the symptoms. Treatments tried: levothyroxin. The treatment provided significant relief.   Vitamin B12 deficiency  Chronic condition, patient has had the condition for over a year.  Condition is  "stable, patient is currently taking vitamin B12 injections once a month.  Associated symptoms initially were fatigue, dry skin, and myalgia.  Patient is doing well on vitamin supplements and is requesting refill today.  Vitamin D deficiency  This is a chronic problem.  Current episode has been for longer than 6 months.  Problem has gradually been improving since he has been on vitamin D supplements.  He denies any excessive fatigue, hair loss, skin changes due to the vitamin D deficiency.  He tries to eat a well-balanced diet.  He has been on the vitamin D weekly, no compliance problems.  Condition is stable  History of Present Illness  The patient presents for weight loss, pain management, and health maintenance.    A satisfactory response to Wegovy is reported, with a total weight loss of 8 pounds. She expresses a desire to increase the dosage from 0.5 mg to 1 mg.    Aches and pains continue but are managed with the current medication regimen. No edema is noted in the lower extremities, except for occasional swelling in the evenings, attributed to prolonged standing on concrete surfaces.    Sleep patterns are consistent, with bedtime at 10:00 PM and waking at 4:00 AM. Coffee intake has been significantly reduced. Seafood is not consumed, and a low-salt diet is maintained. Ambien remains effective for sleep.    SOCIAL HISTORY  She is planning to fly back home next month. She works in a warehouse.    FAMILY HISTORY  Her sister had heart surgery. Her brother-in-law has lung cancer.      Objective   Vital Signs:   /70   Pulse 79   Temp 97.9 °F (36.6 °C)   Ht 154.9 cm (61\")   Wt 69.5 kg (153 lb 4.8 oz)   SpO2 100%   BMI 28.97 kg/m²       Wt Readings from Last 3 Encounters:   04/30/25 69.5 kg (153 lb 4.8 oz)   10/30/24 72.6 kg (160 lb)   03/22/24 73 kg (161 lb)        BP Readings from Last 3 Encounters:   04/30/25 110/70   10/30/24 120/80   03/22/24 130/77                Physical Exam  Vitals reviewed. "   Constitutional:       General: She is not in acute distress.     Appearance: Normal appearance. She is well-developed. She is not ill-appearing.      Comments: Overweight BMI 28   HENT:      Head: Normocephalic and atraumatic.      Right Ear: External ear normal.      Left Ear: External ear normal.   Eyes:      Conjunctiva/sclera: Conjunctivae normal.      Pupils: Pupils are equal, round, and reactive to light.   Cardiovascular:      Rate and Rhythm: Normal rate and regular rhythm.      Heart sounds: No murmur heard.  Pulmonary:      Effort: Pulmonary effort is normal.      Breath sounds: Normal breath sounds. No wheezing.   Skin:     General: Skin is warm and dry.   Neurological:      Mental Status: She is alert and oriented to person, place, and time.   Psychiatric:         Mood and Affect: Mood and affect normal.         Behavior: Behavior normal.         Thought Content: Thought content normal.         Judgment: Judgment normal.        Result Review :  The following data was reviewed by ARLEEN Tierney on 04/30/2025.  Common labs          10/30/2024    16:08   Common Labs   Glucose 88    BUN 17    Creatinine 0.86    Sodium 134    Potassium 4.4    Chloride 101    Calcium 9.3    Albumin 4.0    Total Bilirubin 0.2    Alkaline Phosphatase 66    AST (SGOT) 27    ALT (SGPT) 26    WBC 5.74    Hemoglobin 11.8    Hematocrit 34.3    Platelets 210    Total Cholesterol 177    Triglycerides 77    HDL Cholesterol 92    LDL Cholesterol  71      Data reviewed : previous office note             Current Outpatient Medications on File Prior to Visit   Medication Sig Dispense Refill    Calcium Carbonate-Vitamin D (calcium-vitamin D) 500-200 MG-UNIT tablet per tablet Take 1 tablet by mouth Daily.      [DISCONTINUED] amitriptyline (ELAVIL) 100 MG tablet Take 1 tablet by mouth Every Night. 90 tablet 0    [DISCONTINUED] atorvastatin (LIPITOR) 10 MG tablet Take 1 tablet by mouth Daily. 90 tablet 1    [DISCONTINUED]  "celecoxib (CeleBREX) 400 MG capsule Take 1 capsule by mouth Daily. 90 capsule 0    [DISCONTINUED] cyanocobalamin 1000 MCG/ML injection Inject 1 mL into the appropriate muscle as directed by prescriber Every 30 (Thirty) Days. 3 mL 0    [DISCONTINUED] Diclofenac Sodium (VOLTAREN) 1 % gel gel Apply 4 g topically to the appropriate area as directed 4 (Four) Times a Day As Needed (as needed for pain and swelling.). 100 g 2    [DISCONTINUED] estrogens, conjugated, (PREMARIN) 0.625 MG tablet Take 1 tablet by mouth Daily. Take daily for 21 days then do not take for 7 days. 90 tablet 1    [DISCONTINUED] estrogens, conjugated, (PREMARIN) 0.625 MG tablet Take 1 tablet by mouth Daily. Take daily for 21 days then do not take for 7 days. 90 tablet 0    [DISCONTINUED] gabapentin (NEURONTIN) 300 MG capsule Take 1 capsule by mouth Daily. 90 capsule 1    [DISCONTINUED] levothyroxine (SYNTHROID, LEVOTHROID) 175 MCG tablet Take 1 tablet by mouth Daily. 30 tablet 0    [DISCONTINUED] methocarbamol (ROBAXIN) 500 MG tablet Take 1 tablet by mouth 3 (Three) Times a Day. 90 tablet 0    [DISCONTINUED] ondansetron ODT (ZOFRAN-ODT) 4 MG disintegrating tablet Place 1 tablet on the tongue Every 8 (Eight) Hours As Needed for Nausea or Vomiting. 30 tablet 1    [DISCONTINUED] pantoprazole (PROTONIX) 40 MG EC tablet Take 1 tablet by mouth Daily. 90 tablet 0    [DISCONTINUED] Semaglutide-Weight Management 0.25 MG/0.5ML solution auto-injector Inject 0.5 mL under the skin into the appropriate area as directed 1 (One) Time Per Week. Month 1 starting dose 2 mL 0    [DISCONTINUED] Semaglutide-Weight Management 0.5 MG/0.5ML solution auto-injector Inject 0.5 mL under the skin into the appropriate area as directed 1 (One) Time Per Week. Month 2 continuation dose 2 mL 1    [DISCONTINUED] Syringe 25G X 1-1/2\" 3 ML misc 1 each Every 30 (Thirty) Days. 100 each 0    [DISCONTINUED] vitamin B-6 (PYRIDOXINE) 50 MG tablet Take 1 tablet by mouth Daily. 90 tablet 0    " [DISCONTINUED] zolpidem (AMBIEN) 10 MG tablet Take 1 tablet by mouth At Night As Needed for Sleep. 90 tablet 1     No current facility-administered medications on file prior to visit.        Assessment and Plan  Diagnoses and all orders for this visit:    1. Follow-up exam (Primary)    2. Vitamin B12 deficiency  Comments:  Patient needing refills of vitamin B12  Orders:  -     cyanocobalamin 1000 MCG/ML injection; Inject 1 mL into the appropriate muscle as directed by prescriber Every 30 (Thirty) Days.  Dispense: 3 mL; Refill: 0  -     Vitamin B12 & Folate    3. Class 1 obesity due to excess calories with serious comorbidity and body mass index (BMI) of 30.0 to 30.9 in adult  -     gabapentin (NEURONTIN) 300 MG capsule; Take 1 capsule by mouth Daily.  Dispense: 90 capsule; Refill: 1  -     Semaglutide-Weight Management 1 MG/0.5ML solution auto-injector; Inject 0.5 mL under the skin into the appropriate area as directed 1 (One) Time Per Week.  Dispense: 6 mL; Refill: 0    4. Insomnia, unspecified type  -     zolpidem (AMBIEN) 10 MG tablet; Take 1 tablet by mouth At Night As Needed for Sleep.  Dispense: 90 tablet; Refill: 1    5. Need for vaccination  -     Pneumococcal Conjugate Vaccine 20-Valent All    6. Acquired hypothyroidism  -     levothyroxine (SYNTHROID, LEVOTHROID) 175 MCG tablet; Take 1 tablet by mouth Daily.  Dispense: 90 tablet; Refill: 1  -     Thyroid Panel With TSH    7. Vitamin D deficiency  -     Vitamin D,25-Hydroxy    8. Mixed hyperlipidemia  -     atorvastatin (LIPITOR) 10 MG tablet; Take 1 tablet by mouth Daily.  Dispense: 90 tablet; Refill: 1  -     CBC & Differential  -     Comprehensive Metabolic Panel  -     Lipid Panel    9. Overweight with body mass index (BMI) of 28 to 28.9 in adult    10. IFG (impaired fasting glucose)    11. Post menopausal syndrome  -     estrogens, conjugated, (PREMARIN) 0.625 MG tablet; Take 1 tablet by mouth Daily. Take daily for 21 days then do not take for 7 days.   "Dispense: 90 tablet; Refill: 1    12. Polyarthralgia  -     celecoxib (CeleBREX) 400 MG capsule; Take 1 capsule by mouth Daily.  Dispense: 90 capsule; Refill: 1  -     Diclofenac Sodium (VOLTAREN) 1 % gel gel; Apply 4 g topically to the appropriate area as directed 4 (Four) Times a Day As Needed (as needed for pain and swelling.).  Dispense: 100 g; Refill: 2  -     gabapentin (NEURONTIN) 300 MG capsule; Take 1 capsule by mouth Daily.  Dispense: 90 capsule; Refill: 1  -     methocarbamol (ROBAXIN) 500 MG tablet; Take 1 tablet by mouth 3 (Three) Times a Day.  Dispense: 90 tablet; Refill: 0    Other orders  -     amitriptyline (ELAVIL) 100 MG tablet; Take 1 tablet by mouth Every Night.  Dispense: 90 tablet; Refill: 1  -     ondansetron ODT (ZOFRAN-ODT) 4 MG disintegrating tablet; Place 1 tablet on the tongue Every 8 (Eight) Hours As Needed for Nausea or Vomiting.  Dispense: 30 tablet; Refill: 1  -     pantoprazole (PROTONIX) 40 MG EC tablet; Take 1 tablet by mouth Daily.  Dispense: 90 tablet; Refill: 1  -     Syringe 25G X 1-1/2\" 3 ML misc; Use 1 each Every 30 (Thirty) Days.  Dispense: 100 each; Refill: 1  -     vitamin B-6 (PYRIDOXINE) 50 MG tablet; Take 1 tablet by mouth Daily.  Dispense: 90 tablet; Refill: 0        Assessment & Plan  1. Weight management.  - Weight loss of approximately 8 pounds with the current dose of Wegovy.  - Current dosage of Wegovy is 0.5 mg.  - Dosage of Wegovy will be increased to 1 mg.  - Prescription for Wegovy will be sent to [facility].    2. Pain management.  - Reports that aches and pains are being managed with current medications.  - No swelling in feet or legs noted during the physical exam.  - Ambien is reported to be effective for sleep.    3. Health maintenance.  - Cologuard test is valid for another 3 years.  - Pneumonia vaccine is recommended.  - Blood pressure is 110/70 and heart rate is 79, both within normal limits.    Follow-up  - Follow-up appointment scheduled in 6 " months.      Follow Up   Return in about 6 months (around 10/30/2025) for Recheck.  Patient was given instructions and counseling regarding her condition or for health maintenance advice. Please see specific information pulled into the AVS if appropriate.       Part of this note may be electronic transcription/translation of spoken language to printed text using the Dragon dictation system    Patient or patient representative verbalized consent for the use of Ambient Listening during the visit with  ARLEEN Tierney for chart documentation. 4/30/2025  08:14 EDT